# Patient Record
Sex: MALE | Race: WHITE | NOT HISPANIC OR LATINO | Employment: PART TIME | URBAN - METROPOLITAN AREA
[De-identification: names, ages, dates, MRNs, and addresses within clinical notes are randomized per-mention and may not be internally consistent; named-entity substitution may affect disease eponyms.]

---

## 2023-06-03 LAB
ALANINE AMINOTRANSFERASE (SGPT) (U/L) IN SER/PLAS: 29 U/L (ref 10–52)
ALBUMIN (G/DL) IN SER/PLAS: 4.6 G/DL (ref 3.4–5)
ALKALINE PHOSPHATASE (U/L) IN SER/PLAS: 73 U/L (ref 33–120)
ANION GAP IN SER/PLAS: 15 MMOL/L (ref 10–20)
ASPARTATE AMINOTRANSFERASE (SGOT) (U/L) IN SER/PLAS: 27 U/L (ref 9–39)
BILIRUBIN TOTAL (MG/DL) IN SER/PLAS: 1.1 MG/DL (ref 0–1.2)
CALCIUM (MG/DL) IN SER/PLAS: 10.2 MG/DL (ref 8.6–10.6)
CARBON DIOXIDE, TOTAL (MMOL/L) IN SER/PLAS: 26 MMOL/L (ref 21–32)
CHLORIDE (MMOL/L) IN SER/PLAS: 104 MMOL/L (ref 98–107)
CHOLESTEROL (MG/DL) IN SER/PLAS: 181 MG/DL (ref 0–199)
CHOLESTEROL IN HDL (MG/DL) IN SER/PLAS: 28.1 MG/DL
CHOLESTEROL/HDL RATIO: 6.4
CREATININE (MG/DL) IN SER/PLAS: 1.05 MG/DL (ref 0.5–1.3)
ERYTHROCYTE DISTRIBUTION WIDTH (RATIO) BY AUTOMATED COUNT: 13.3 % (ref 11.5–14.5)
ERYTHROCYTE MEAN CORPUSCULAR HEMOGLOBIN CONCENTRATION (G/DL) BY AUTOMATED: 32.4 G/DL (ref 32–36)
ERYTHROCYTE MEAN CORPUSCULAR VOLUME (FL) BY AUTOMATED COUNT: 88 FL (ref 80–100)
ERYTHROCYTES (10*6/UL) IN BLOOD BY AUTOMATED COUNT: 5.55 X10E12/L (ref 4.5–5.9)
GFR MALE: >90 ML/MIN/1.73M2
GLUCOSE (MG/DL) IN SER/PLAS: 87 MG/DL (ref 74–99)
HEMATOCRIT (%) IN BLOOD BY AUTOMATED COUNT: 49.1 % (ref 41–52)
HEMOGLOBIN (G/DL) IN BLOOD: 15.9 G/DL (ref 13.5–17.5)
LDL: 97 MG/DL (ref 0–99)
LEUKOCYTES (10*3/UL) IN BLOOD BY AUTOMATED COUNT: 11 X10E9/L (ref 4.4–11.3)
NON HDL CHOLESTEROL: 153 MG/DL
NRBC (PER 100 WBCS) BY AUTOMATED COUNT: 0 /100 WBC (ref 0–0)
PLATELETS (10*3/UL) IN BLOOD AUTOMATED COUNT: 255 X10E9/L (ref 150–450)
POTASSIUM (MMOL/L) IN SER/PLAS: 4.2 MMOL/L (ref 3.5–5.3)
PROTEIN TOTAL: 7.2 G/DL (ref 6.4–8.2)
SODIUM (MMOL/L) IN SER/PLAS: 141 MMOL/L (ref 136–145)
TESTOSTERONE (NG/DL) IN SER/PLAS: 171 NG/DL (ref 240–1000)
TRIGLYCERIDE (MG/DL) IN SER/PLAS: 279 MG/DL (ref 0–149)
UREA NITROGEN (MG/DL) IN SER/PLAS: 11 MG/DL (ref 6–23)
VLDL: 56 MG/DL (ref 0–40)

## 2023-08-30 PROBLEM — E78.5 HYPERLIPIDEMIA: Status: ACTIVE | Noted: 2023-08-30

## 2023-08-30 PROBLEM — Q98.4: Status: ACTIVE | Noted: 2023-08-30

## 2023-08-30 PROBLEM — E29.1 HYPOGONADISM IN MALE: Status: ACTIVE | Noted: 2023-08-30

## 2023-08-30 PROBLEM — L65.9 ALOPECIA OF SCALP: Status: ACTIVE | Noted: 2023-08-30

## 2023-08-30 PROBLEM — J45.909 ASTHMA (HHS-HCC): Status: ACTIVE | Noted: 2023-08-30

## 2023-08-30 PROBLEM — R55 SYNCOPE AND COLLAPSE: Status: ACTIVE | Noted: 2023-08-30

## 2023-08-30 RX ORDER — TESTOSTERONE 4 MG/D
PATCH TRANSDERMAL EVERY 24 HOURS
COMMUNITY
Start: 2015-10-14 | End: 2024-02-12 | Stop reason: WASHOUT

## 2023-08-30 RX ORDER — CLINDAMYCIN HYDROCHLORIDE 150 MG/1
CAPSULE ORAL
COMMUNITY
End: 2024-04-18 | Stop reason: WASHOUT

## 2023-08-30 RX ORDER — TESTOSTERONE 2 MG/D
PATCH TRANSDERMAL
COMMUNITY
End: 2024-02-12 | Stop reason: WASHOUT

## 2023-10-07 ENCOUNTER — LAB (OUTPATIENT)
Dept: LAB | Facility: LAB | Age: 28
End: 2023-10-07
Payer: COMMERCIAL

## 2023-10-07 DIAGNOSIS — E29.1 TESTICULAR HYPOFUNCTION: Primary | ICD-10-CM

## 2023-10-07 LAB
BASOPHILS # BLD AUTO: 0.17 X10*3/UL (ref 0–0.1)
BASOPHILS NFR BLD AUTO: 1.4 %
EOSINOPHIL # BLD AUTO: 0.83 X10*3/UL (ref 0–0.7)
EOSINOPHIL NFR BLD AUTO: 6.8 %
ERYTHROCYTE [DISTWIDTH] IN BLOOD BY AUTOMATED COUNT: 13.2 % (ref 11.5–14.5)
HCT VFR BLD AUTO: 50.1 % (ref 41–52)
HGB BLD-MCNC: 16.2 G/DL (ref 13.5–17.5)
IMM GRANULOCYTES # BLD AUTO: 0.1 X10*3/UL (ref 0–0.7)
IMM GRANULOCYTES NFR BLD AUTO: 0.8 % (ref 0–0.9)
LYMPHOCYTES # BLD AUTO: 4.77 X10*3/UL (ref 1.2–4.8)
LYMPHOCYTES NFR BLD AUTO: 39 %
MCH RBC QN AUTO: 28.8 PG (ref 26–34)
MCHC RBC AUTO-ENTMCNC: 32.3 G/DL (ref 32–36)
MCV RBC AUTO: 89 FL (ref 80–100)
MONOCYTES # BLD AUTO: 0.89 X10*3/UL (ref 0.1–1)
MONOCYTES NFR BLD AUTO: 7.3 %
NEUTROPHILS # BLD AUTO: 5.47 X10*3/UL (ref 1.2–7.7)
NEUTROPHILS NFR BLD AUTO: 44.7 %
NRBC BLD-RTO: 0 /100 WBCS (ref 0–0)
PLATELET # BLD AUTO: 260 X10*3/UL (ref 150–450)
PMV BLD AUTO: 10 FL (ref 7.5–11.5)
RBC # BLD AUTO: 5.62 X10*6/UL (ref 4.5–5.9)
TESTOST SERPL-MCNC: 257 NG/DL (ref 240–1000)
WBC # BLD AUTO: 12.2 X10*3/UL (ref 4.4–11.3)

## 2023-10-07 PROCEDURE — 84403 ASSAY OF TOTAL TESTOSTERONE: CPT

## 2023-10-07 PROCEDURE — 36415 COLL VENOUS BLD VENIPUNCTURE: CPT

## 2023-10-07 PROCEDURE — 85025 COMPLETE CBC W/AUTO DIFF WBC: CPT

## 2023-10-09 ENCOUNTER — APPOINTMENT (OUTPATIENT)
Dept: RADIOLOGY | Facility: HOSPITAL | Age: 28
End: 2023-10-09
Payer: COMMERCIAL

## 2023-10-09 ENCOUNTER — APPOINTMENT (OUTPATIENT)
Dept: ENDOCRINOLOGY | Facility: CLINIC | Age: 28
End: 2023-10-09
Payer: COMMERCIAL

## 2023-10-09 ENCOUNTER — HOSPITAL ENCOUNTER (EMERGENCY)
Facility: HOSPITAL | Age: 28
Discharge: HOME | End: 2023-10-10
Attending: STUDENT IN AN ORGANIZED HEALTH CARE EDUCATION/TRAINING PROGRAM
Payer: COMMERCIAL

## 2023-10-09 DIAGNOSIS — R10.9 ABDOMINAL PAIN, UNSPECIFIED ABDOMINAL LOCATION: ICD-10-CM

## 2023-10-09 DIAGNOSIS — R55 SYNCOPE, UNSPECIFIED SYNCOPE TYPE: ICD-10-CM

## 2023-10-09 DIAGNOSIS — J18.9 PNEUMONIA OF BOTH LUNGS DUE TO INFECTIOUS ORGANISM, UNSPECIFIED PART OF LUNG: Primary | ICD-10-CM

## 2023-10-09 LAB
ANION GAP SERPL CALC-SCNC: 12 MMOL/L
BASOPHILS # BLD AUTO: 0.11 X10*3/UL (ref 0–0.1)
BASOPHILS NFR BLD AUTO: 0.6 %
BUN SERPL-MCNC: 12 MG/DL (ref 8–25)
CALCIUM SERPL-MCNC: 10 MG/DL (ref 8.5–10.4)
CHLORIDE SERPL-SCNC: 99 MMOL/L (ref 97–107)
CO2 SERPL-SCNC: 24 MMOL/L (ref 24–31)
CREAT SERPL-MCNC: 1.1 MG/DL (ref 0.4–1.6)
EOSINOPHIL # BLD AUTO: 0.05 X10*3/UL (ref 0–0.7)
EOSINOPHIL NFR BLD AUTO: 0.3 %
ERYTHROCYTE [DISTWIDTH] IN BLOOD BY AUTOMATED COUNT: 13.9 % (ref 11.5–14.5)
GFR SERPL CREATININE-BSD FRML MDRD: >90 ML/MIN/1.73M*2
GLUCOSE SERPL-MCNC: 113 MG/DL (ref 65–99)
HCT VFR BLD AUTO: 47.4 % (ref 41–52)
HGB BLD-MCNC: 16.1 G/DL (ref 13.5–17.5)
IMM GRANULOCYTES # BLD AUTO: 0.08 X10*3/UL (ref 0–0.7)
IMM GRANULOCYTES NFR BLD AUTO: 0.4 % (ref 0–0.9)
LYMPHOCYTES # BLD AUTO: 1.57 X10*3/UL (ref 1.2–4.8)
LYMPHOCYTES NFR BLD AUTO: 8.6 %
MCH RBC QN AUTO: 29.2 PG (ref 26–34)
MCHC RBC AUTO-ENTMCNC: 34 G/DL (ref 32–36)
MCV RBC AUTO: 86 FL (ref 80–100)
MONOCYTES # BLD AUTO: 1.41 X10*3/UL (ref 0.1–1)
MONOCYTES NFR BLD AUTO: 7.7 %
NEUTROPHILS # BLD AUTO: 15.06 X10*3/UL (ref 1.2–7.7)
NEUTROPHILS NFR BLD AUTO: 82.4 %
NRBC BLD-RTO: 0 /100 WBCS (ref 0–0)
PLATELET # BLD AUTO: 231 X10*3/UL (ref 150–450)
PMV BLD AUTO: 9.3 FL (ref 7.5–11.5)
POTASSIUM SERPL-SCNC: 4.1 MMOL/L (ref 3.4–5.1)
RBC # BLD AUTO: 5.51 X10*6/UL (ref 4.5–5.9)
SODIUM SERPL-SCNC: 135 MMOL/L (ref 133–145)
TROPONIN T SERPL-MCNC: <6 NG/L
TROPONIN T SERPL-MCNC: <6 NG/L
WBC # BLD AUTO: 18.3 X10*3/UL (ref 4.4–11.3)

## 2023-10-09 PROCEDURE — 96365 THER/PROPH/DIAG IV INF INIT: CPT | Mod: XU

## 2023-10-09 PROCEDURE — 2550000001 HC RX 255 CONTRASTS: Performed by: STUDENT IN AN ORGANIZED HEALTH CARE EDUCATION/TRAINING PROGRAM

## 2023-10-09 PROCEDURE — 99285 EMERGENCY DEPT VISIT HI MDM: CPT

## 2023-10-09 PROCEDURE — 74177 CT ABD & PELVIS W/CONTRAST: CPT

## 2023-10-09 PROCEDURE — 84484 ASSAY OF TROPONIN QUANT: CPT | Performed by: STUDENT IN AN ORGANIZED HEALTH CARE EDUCATION/TRAINING PROGRAM

## 2023-10-09 PROCEDURE — 36415 COLL VENOUS BLD VENIPUNCTURE: CPT | Performed by: STUDENT IN AN ORGANIZED HEALTH CARE EDUCATION/TRAINING PROGRAM

## 2023-10-09 PROCEDURE — 96368 THER/DIAG CONCURRENT INF: CPT

## 2023-10-09 PROCEDURE — 85025 COMPLETE CBC W/AUTO DIFF WBC: CPT | Performed by: STUDENT IN AN ORGANIZED HEALTH CARE EDUCATION/TRAINING PROGRAM

## 2023-10-09 PROCEDURE — 96361 HYDRATE IV INFUSION ADD-ON: CPT

## 2023-10-09 PROCEDURE — 71046 X-RAY EXAM CHEST 2 VIEWS: CPT | Mod: FY

## 2023-10-09 PROCEDURE — 99283 EMERGENCY DEPT VISIT LOW MDM: CPT

## 2023-10-09 PROCEDURE — 82435 ASSAY OF BLOOD CHLORIDE: CPT | Performed by: STUDENT IN AN ORGANIZED HEALTH CARE EDUCATION/TRAINING PROGRAM

## 2023-10-09 PROCEDURE — 84484 ASSAY OF TROPONIN QUANT: CPT | Mod: 91 | Performed by: STUDENT IN AN ORGANIZED HEALTH CARE EDUCATION/TRAINING PROGRAM

## 2023-10-09 PROCEDURE — 2500000004 HC RX 250 GENERAL PHARMACY W/ HCPCS (ALT 636 FOR OP/ED): Performed by: STUDENT IN AN ORGANIZED HEALTH CARE EDUCATION/TRAINING PROGRAM

## 2023-10-09 RX ORDER — AZITHROMYCIN 100 MG/5ML
100 POWDER, FOR SUSPENSION ORAL DAILY
Qty: 20 ML | Refills: 0 | Status: SHIPPED | OUTPATIENT
Start: 2023-10-09 | End: 2023-10-13

## 2023-10-09 RX ORDER — AMOXICILLIN AND CLAVULANATE POTASSIUM 400; 57 MG/5ML; MG/5ML
875 POWDER, FOR SUSPENSION ORAL 2 TIMES DAILY
Qty: 218 ML | Refills: 0 | Status: SHIPPED | OUTPATIENT
Start: 2023-10-09 | End: 2023-10-19

## 2023-10-09 RX ORDER — AMOXICILLIN AND CLAVULANATE POTASSIUM 875; 125 MG/1; MG/1
1 TABLET, FILM COATED ORAL ONCE
Status: DISCONTINUED | OUTPATIENT
Start: 2023-10-09 | End: 2023-10-09

## 2023-10-09 RX ORDER — AZITHROMYCIN 500 MG/1
500 TABLET, FILM COATED ORAL ONCE
Status: DISCONTINUED | OUTPATIENT
Start: 2023-10-09 | End: 2023-10-09

## 2023-10-09 RX ORDER — ONDANSETRON HYDROCHLORIDE 4 MG/5ML
4 SOLUTION ORAL 2 TIMES DAILY PRN
Qty: 50 ML | Refills: 0 | Status: SHIPPED | OUTPATIENT
Start: 2023-10-09 | End: 2024-04-15 | Stop reason: WASHOUT

## 2023-10-09 RX ORDER — CEFTRIAXONE 1 G/50ML
1 INJECTION, SOLUTION INTRAVENOUS ONCE
Status: COMPLETED | OUTPATIENT
Start: 2023-10-09 | End: 2023-10-09

## 2023-10-09 RX ADMIN — SODIUM CHLORIDE 1000 ML: 900 INJECTION, SOLUTION INTRAVENOUS at 19:50

## 2023-10-09 RX ADMIN — SODIUM CHLORIDE 1000 ML: 900 INJECTION, SOLUTION INTRAVENOUS at 22:13

## 2023-10-09 RX ADMIN — IOHEXOL 75 ML: 350 INJECTION, SOLUTION INTRAVENOUS at 21:15

## 2023-10-09 RX ADMIN — CEFTRIAXONE SODIUM 1 G: 1 INJECTION, SOLUTION INTRAVENOUS at 21:26

## 2023-10-09 RX ADMIN — AZITHROMYCIN 500 MG: 500 INJECTION, POWDER, LYOPHILIZED, FOR SOLUTION INTRAVENOUS at 21:20

## 2023-10-09 ASSESSMENT — PAIN SCALES - GENERAL: PAINLEVEL_OUTOF10: 7

## 2023-10-09 ASSESSMENT — PAIN - FUNCTIONAL ASSESSMENT: PAIN_FUNCTIONAL_ASSESSMENT: 0-10

## 2023-10-09 ASSESSMENT — PAIN DESCRIPTION - LOCATION: LOCATION: ABDOMEN

## 2023-10-09 ASSESSMENT — COLUMBIA-SUICIDE SEVERITY RATING SCALE - C-SSRS
1. IN THE PAST MONTH, HAVE YOU WISHED YOU WERE DEAD OR WISHED YOU COULD GO TO SLEEP AND NOT WAKE UP?: NO
6. HAVE YOU EVER DONE ANYTHING, STARTED TO DO ANYTHING, OR PREPARED TO DO ANYTHING TO END YOUR LIFE?: NO
2. HAVE YOU ACTUALLY HAD ANY THOUGHTS OF KILLING YOURSELF?: NO

## 2023-10-09 NOTE — ED PROVIDER NOTES
"HPI   Chief Complaint   Patient presents with    Syncope     Ems states \"We were called across the street for the patient passing out in the lobby at his endo appt. He was conscious when we got there and appropriately acting himself, he is developmentally delayed. Per his mother he tends to be a little dramatic with stuff, other than that patients vitals are all stable.\" Pt states \"my belly hurts all over\" pt denies CP/SOB/N/V/fever/chills       Patient presents to the emergency department after a syncopal episode.  He was with his mother at a doctor's appointment when he stated that he did not feel right his eyes rolled back and he passed out.  He has had a previous episode several years ago while at a softball game in the bathroom in which she also passed out.  He states that his abdomen has been intermittently hurting over the last several days and his mother states that he has not been eating or drinking as much as usual.  He has not been vomiting however.                          No data recorded                Patient History   Past Medical History:   Diagnosis Date    Allergy status to unspecified drugs, medicaments and biological substances 10/05/2015    History of seasonal allergies    Allergy to other foods 10/05/2015    History of food allergy     History reviewed. No pertinent surgical history.  Family History   Problem Relation Name Age of Onset    Arthritis Mother      Hypertension Mother      No Known Problems Father      Colon cancer Maternal Grandmother      Arthritis Maternal Grandmother      Hypothyroidism Maternal Grandmother      Hyperlipidemia Maternal Grandfather      Colon cancer Paternal Grandfather      Breast cancer Other aunt      Social History     Tobacco Use    Smoking status: Never    Smokeless tobacco: Never   Substance Use Topics    Alcohol use: Never    Drug use: Never       Physical Exam   ED Triage Vitals [10/09/23 1720]   Temp Heart Rate Resp BP   36.6 °C (97.9 °F) 107 20 123/72 "      SpO2 Temp Source Heart Rate Source Patient Position   92 % Oral Monitor Sitting      BP Location FiO2 (%)     Left arm --       Physical Exam  HENT:      Head: Normocephalic.   Eyes:      General: No scleral icterus.  Cardiovascular:      Rate and Rhythm: Tachycardia present.   Pulmonary:      Effort: Pulmonary effort is normal.   Abdominal:      Comments: Soft, nontender to palpation   Skin:     General: Skin is warm.   Neurological:      Mental Status: He is alert.      Comments: Patient awake, answers questions appropriately, follows commands.  Moves all extremities normally.       EKG interpretation by me: Sinus tachycardia, rate 105.  Normal axis.  No ST or T wave abnormalities.  Normal QTc and QRS durations.    ED Course & MDM   Diagnoses as of 10/09/23 2300   Pneumonia of both lungs due to infectious organism, unspecified part of lung   Abdominal pain, unspecified abdominal location   Syncope, unspecified syncope type       Medical Decision Making  Laboratory studies significant for leukocytosis.  Chest x-ray reveals pneumonia, as does CT abdomen.  Patient given first dose of IV antibiotics.  Following IV fluids, heart rate normalized.  While patient does meet SIRS criteria with initially elevated heart rate and elevated white blood cell count, patient is otherwise well-appearing.  My suspicion for sepsis is thus lower.  Patient and mother were given option for trial of outpatient therapy with oral antibiotics and they prefer trial of outpatient therapy.  Strict return precautions given for any worsening of symptoms, inability to tolerate antibiotics or oral intake and they agree to return.  Patient advised to follow-up with primary care physician.  My suspicion for cardiac arrhythmia as etiology of syncope is very low.  Parts of this chart were completed with dictation software, please excuse any errors in transcription.        Procedure  Procedures     Kerwin Chicas MD  10/09/23 8104

## 2023-10-10 ENCOUNTER — HOSPITAL ENCOUNTER (OUTPATIENT)
Dept: CARDIOLOGY | Facility: HOSPITAL | Age: 28
Discharge: HOME | End: 2023-10-10
Payer: COMMERCIAL

## 2023-10-10 VITALS
RESPIRATION RATE: 20 BRPM | TEMPERATURE: 98.4 F | DIASTOLIC BLOOD PRESSURE: 70 MMHG | HEIGHT: 71 IN | BODY MASS INDEX: 34.6 KG/M2 | SYSTOLIC BLOOD PRESSURE: 120 MMHG | WEIGHT: 247.14 LBS | HEART RATE: 99 BPM | OXYGEN SATURATION: 93 %

## 2023-10-10 LAB
ATRIAL RATE: 105 BPM
P AXIS: 23 DEGREES
P OFFSET: 202 MS
P ONSET: 154 MS
PR INTERVAL: 138 MS
Q ONSET: 223 MS
QRS COUNT: 17 BEATS
QRS DURATION: 94 MS
QT INTERVAL: 322 MS
QTC CALCULATION(BAZETT): 425 MS
QTC FREDERICIA: 388 MS
R AXIS: 3 DEGREES
T AXIS: 6 DEGREES
T OFFSET: 384 MS
VENTRICULAR RATE: 105 BPM

## 2023-10-10 PROCEDURE — 93005 ELECTROCARDIOGRAM TRACING: CPT

## 2023-10-10 NOTE — DISCHARGE INSTRUCTIONS
Your x-ray and CAT scan revealed pneumonia.  You have been given a prescription for antibiotics for pneumonia as well as a prescription for nausea medication to use as needed.  You should try to drink extra fluids over the coming days.  You may use Tylenol as needed for discomfort or fever.  Follow-up with your primary care physician.  Return to the emergency department with any worsening symptoms or inability to tolerate your antibiotics.    It is important to remember that your care does not end here and you must continue to monitor your condition closely. Please return to the emergency department for any worsening or concerning signs or symptoms as directed by our conversations and the discharge instructions. If you do not have a doctor please contact the referral number on your discharge instructions. Please contact any physician specialists provided in your discharge notes as it is very important to follow up with them regarding your condition. If you are unable to reach the physicians provided, please come back to the Emergency Department at any time.    Return to emergency room without delay for ANY new or worsening pains or for any other symptoms or concerns.  Return with worsening pains, nausea, vomiting, trouble breathing, palpitations, shortness of breath, inability to pass stool or urine, loss of control of stool or urine, any numbness or tingling (that is not normal for you), uncontrolled fevers, the passing of blood or other material in stool or urine, rashes, pains or for any other symptoms or concerns you may have.  You are always welcome to return to the ER at any time for any reason or for any other concerns you may have.

## 2023-10-10 NOTE — ED NOTES
Patient states that he is here after passing out at his PCP's office today. Patient states that he has had abdominal pain, cough and chills since Saturday. Patient states that his cough has been productive. Patient denies fever, N/V/D.      Susie Varma RN  10/09/23 2011

## 2023-10-11 ENCOUNTER — TELEPHONE (OUTPATIENT)
Dept: PRIMARY CARE | Facility: CLINIC | Age: 28
End: 2023-10-11
Payer: COMMERCIAL

## 2023-10-11 DIAGNOSIS — J18.9 PNEUMONIA DUE TO INFECTIOUS ORGANISM, UNSPECIFIED LATERALITY, UNSPECIFIED PART OF LUNG: ICD-10-CM

## 2023-10-11 RX ORDER — BENZONATATE 200 MG/1
200 CAPSULE ORAL 3 TIMES DAILY PRN
Qty: 30 CAPSULE | Refills: 0 | Status: SHIPPED | OUTPATIENT
Start: 2023-10-11 | End: 2023-10-21

## 2023-10-11 NOTE — TELEPHONE ENCOUNTER
PT was at [facility] 10/9  dx: pneumonia  - f/u 10/17 w/ NP     Pt was on IV antibiotics @ ER, on oral antibiotics now. Coughs nonstop. Requests med for cough     [facility].

## 2023-10-17 ENCOUNTER — APPOINTMENT (OUTPATIENT)
Dept: PRIMARY CARE | Facility: CLINIC | Age: 28
End: 2023-10-17
Payer: COMMERCIAL

## 2023-10-24 ENCOUNTER — TELEPHONE (OUTPATIENT)
Dept: PRIMARY CARE | Facility: CLINIC | Age: 28
End: 2023-10-24
Payer: COMMERCIAL

## 2023-10-24 DIAGNOSIS — J45.20 MILD INTERMITTENT ASTHMA, UNSPECIFIED WHETHER COMPLICATED (HHS-HCC): Primary | ICD-10-CM

## 2023-10-24 RX ORDER — ALBUTEROL SULFATE 90 UG/1
2 AEROSOL, METERED RESPIRATORY (INHALATION) EVERY 4 HOURS PRN
Qty: 8.5 G | Refills: 3 | Status: SHIPPED | OUTPATIENT
Start: 2023-10-24 | End: 2024-10-23

## 2023-10-24 NOTE — TELEPHONE ENCOUNTER
I tried to do a refill request for patient's albuterol and have you send it to [facility] [address] [facility].  It would not let me do the refill process.

## 2023-11-17 ENCOUNTER — ANCILLARY PROCEDURE (OUTPATIENT)
Dept: RADIOLOGY | Facility: CLINIC | Age: 28
End: 2023-11-17
Payer: COMMERCIAL

## 2023-11-17 DIAGNOSIS — J18.9 PNEUMONIA DUE TO INFECTIOUS ORGANISM, UNSPECIFIED LATERALITY, UNSPECIFIED PART OF LUNG: ICD-10-CM

## 2023-11-17 PROCEDURE — 71046 X-RAY EXAM CHEST 2 VIEWS: CPT | Mod: FY

## 2023-11-20 DIAGNOSIS — E29.1 HYPOGONADISM IN MALE: Primary | ICD-10-CM

## 2023-11-20 DIAGNOSIS — E29.1 HYPOGONADISM IN MALE: ICD-10-CM

## 2023-11-24 ENCOUNTER — HOSPITAL ENCOUNTER (EMERGENCY)
Facility: HOSPITAL | Age: 28
Discharge: HOME | End: 2023-11-24
Attending: STUDENT IN AN ORGANIZED HEALTH CARE EDUCATION/TRAINING PROGRAM
Payer: COMMERCIAL

## 2023-11-24 ENCOUNTER — APPOINTMENT (OUTPATIENT)
Dept: RADIOLOGY | Facility: HOSPITAL | Age: 28
End: 2023-11-24
Payer: COMMERCIAL

## 2023-11-24 VITALS
DIASTOLIC BLOOD PRESSURE: 70 MMHG | BODY MASS INDEX: 34.36 KG/M2 | HEART RATE: 81 BPM | SYSTOLIC BLOOD PRESSURE: 138 MMHG | RESPIRATION RATE: 18 BRPM | TEMPERATURE: 98.1 F | OXYGEN SATURATION: 95 % | WEIGHT: 240 LBS | HEIGHT: 70 IN

## 2023-11-24 DIAGNOSIS — R10.84 GENERALIZED ABDOMINAL PAIN: Primary | ICD-10-CM

## 2023-11-24 LAB
ALBUMIN SERPL-MCNC: 4.8 G/DL (ref 3.5–5)
ALP BLD-CCNC: 86 U/L (ref 35–125)
ALT SERPL-CCNC: 30 U/L (ref 5–40)
AMORPH CRY #/AREA UR COMP ASSIST: NORMAL /HPF
ANION GAP SERPL CALC-SCNC: 13 MMOL/L
APPEARANCE UR: ABNORMAL
AST SERPL-CCNC: 27 U/L (ref 5–40)
BASOPHILS # BLD AUTO: 0.11 X10*3/UL (ref 0–0.1)
BASOPHILS NFR BLD AUTO: 1.3 %
BILIRUB SERPL-MCNC: 0.6 MG/DL (ref 0.1–1.2)
BILIRUB UR STRIP.AUTO-MCNC: NEGATIVE MG/DL
BUN SERPL-MCNC: 11 MG/DL (ref 8–25)
CALCIUM SERPL-MCNC: 9.8 MG/DL (ref 8.5–10.4)
CHLORIDE SERPL-SCNC: 104 MMOL/L (ref 97–107)
CO2 SERPL-SCNC: 22 MMOL/L (ref 24–31)
COLOR UR: ABNORMAL
CREAT SERPL-MCNC: 0.9 MG/DL (ref 0.4–1.6)
EOSINOPHIL # BLD AUTO: 0.63 X10*3/UL (ref 0–0.7)
EOSINOPHIL NFR BLD AUTO: 7.6 %
ERYTHROCYTE [DISTWIDTH] IN BLOOD BY AUTOMATED COUNT: 13.3 % (ref 11.5–14.5)
GFR SERPL CREATININE-BSD FRML MDRD: >90 ML/MIN/1.73M*2
GLUCOSE SERPL-MCNC: 107 MG/DL (ref 65–99)
GLUCOSE UR STRIP.AUTO-MCNC: NORMAL MG/DL
HCT VFR BLD AUTO: 46.8 % (ref 41–52)
HGB BLD-MCNC: 16 G/DL (ref 13.5–17.5)
IMM GRANULOCYTES # BLD AUTO: 0.02 X10*3/UL (ref 0–0.7)
IMM GRANULOCYTES NFR BLD AUTO: 0.2 % (ref 0–0.9)
KETONES UR STRIP.AUTO-MCNC: NEGATIVE MG/DL
LEUKOCYTE ESTERASE UR QL STRIP.AUTO: NEGATIVE
LIPASE SERPL-CCNC: 45 U/L (ref 16–63)
LYMPHOCYTES # BLD AUTO: 2.96 X10*3/UL (ref 1.2–4.8)
LYMPHOCYTES NFR BLD AUTO: 35.7 %
MCH RBC QN AUTO: 28.9 PG (ref 26–34)
MCHC RBC AUTO-ENTMCNC: 34.2 G/DL (ref 32–36)
MCV RBC AUTO: 85 FL (ref 80–100)
MONOCYTES # BLD AUTO: 0.85 X10*3/UL (ref 0.1–1)
MONOCYTES NFR BLD AUTO: 10.3 %
MUCOUS THREADS #/AREA URNS AUTO: NORMAL /LPF
NEUTROPHILS # BLD AUTO: 3.71 X10*3/UL (ref 1.2–7.7)
NEUTROPHILS NFR BLD AUTO: 44.9 %
NITRITE UR QL STRIP.AUTO: NEGATIVE
NRBC BLD-RTO: 0 /100 WBCS (ref 0–0)
PH UR STRIP.AUTO: 5.5 [PH]
PLATELET # BLD AUTO: 235 X10*3/UL (ref 150–450)
POTASSIUM SERPL-SCNC: 3.9 MMOL/L (ref 3.4–5.1)
PROT SERPL-MCNC: 8 G/DL (ref 5.9–7.9)
PROT UR STRIP.AUTO-MCNC: ABNORMAL MG/DL
RBC # BLD AUTO: 5.53 X10*6/UL (ref 4.5–5.9)
RBC # UR STRIP.AUTO: NEGATIVE /UL
RBC #/AREA URNS AUTO: NORMAL /HPF
SODIUM SERPL-SCNC: 139 MMOL/L (ref 133–145)
SP GR UR STRIP.AUTO: 1.03
UROBILINOGEN UR STRIP.AUTO-MCNC: ABNORMAL MG/DL
WBC # BLD AUTO: 8.3 X10*3/UL (ref 4.4–11.3)
WBC #/AREA URNS AUTO: NORMAL /HPF

## 2023-11-24 PROCEDURE — 2500000004 HC RX 250 GENERAL PHARMACY W/ HCPCS (ALT 636 FOR OP/ED): Performed by: PHYSICIAN ASSISTANT

## 2023-11-24 PROCEDURE — 80053 COMPREHEN METABOLIC PANEL: CPT | Performed by: PHYSICIAN ASSISTANT

## 2023-11-24 PROCEDURE — 84075 ASSAY ALKALINE PHOSPHATASE: CPT | Performed by: PHYSICIAN ASSISTANT

## 2023-11-24 PROCEDURE — 85025 COMPLETE CBC W/AUTO DIFF WBC: CPT | Performed by: PHYSICIAN ASSISTANT

## 2023-11-24 PROCEDURE — 74177 CT ABD & PELVIS W/CONTRAST: CPT

## 2023-11-24 PROCEDURE — 99285 EMERGENCY DEPT VISIT HI MDM: CPT | Mod: 25 | Performed by: STUDENT IN AN ORGANIZED HEALTH CARE EDUCATION/TRAINING PROGRAM

## 2023-11-24 PROCEDURE — 83690 ASSAY OF LIPASE: CPT | Performed by: PHYSICIAN ASSISTANT

## 2023-11-24 PROCEDURE — 81001 URINALYSIS AUTO W/SCOPE: CPT | Performed by: PHYSICIAN ASSISTANT

## 2023-11-24 PROCEDURE — 2550000001 HC RX 255 CONTRASTS: Performed by: STUDENT IN AN ORGANIZED HEALTH CARE EDUCATION/TRAINING PROGRAM

## 2023-11-24 PROCEDURE — 96374 THER/PROPH/DIAG INJ IV PUSH: CPT | Mod: 59

## 2023-11-24 PROCEDURE — 36415 COLL VENOUS BLD VENIPUNCTURE: CPT | Performed by: PHYSICIAN ASSISTANT

## 2023-11-24 PROCEDURE — 99284 EMERGENCY DEPT VISIT MOD MDM: CPT | Mod: 25

## 2023-11-24 PROCEDURE — 94760 N-INVAS EAR/PLS OXIMETRY 1: CPT

## 2023-11-24 PROCEDURE — 96375 TX/PRO/DX INJ NEW DRUG ADDON: CPT

## 2023-11-24 RX ORDER — KETOROLAC TROMETHAMINE 30 MG/ML
15 INJECTION, SOLUTION INTRAMUSCULAR; INTRAVENOUS ONCE
Status: COMPLETED | OUTPATIENT
Start: 2023-11-24 | End: 2023-11-24

## 2023-11-24 RX ORDER — ONDANSETRON HYDROCHLORIDE 2 MG/ML
4 INJECTION, SOLUTION INTRAVENOUS ONCE
Status: COMPLETED | OUTPATIENT
Start: 2023-11-24 | End: 2023-11-24

## 2023-11-24 RX ORDER — DICYCLOMINE HYDROCHLORIDE 20 MG/1
20 TABLET ORAL 2 TIMES DAILY
Qty: 20 TABLET | Refills: 0 | Status: SHIPPED | OUTPATIENT
Start: 2023-11-24 | End: 2023-11-28 | Stop reason: SDUPTHER

## 2023-11-24 RX ORDER — OMEPRAZOLE 20 MG/1
20 CAPSULE, DELAYED RELEASE ORAL DAILY
Qty: 30 CAPSULE | Refills: 0 | Status: SHIPPED | OUTPATIENT
Start: 2023-11-24 | End: 2023-12-24

## 2023-11-24 RX ADMIN — SODIUM CHLORIDE 1000 ML: 900 INJECTION, SOLUTION INTRAVENOUS at 09:19

## 2023-11-24 RX ADMIN — IOHEXOL 75 ML: 350 INJECTION, SOLUTION INTRAVENOUS at 10:39

## 2023-11-24 RX ADMIN — KETOROLAC TROMETHAMINE 15 MG: 30 INJECTION INTRAMUSCULAR; INTRAVENOUS at 09:20

## 2023-11-24 RX ADMIN — ONDANSETRON 4 MG: 2 INJECTION INTRAMUSCULAR; INTRAVENOUS at 09:20

## 2023-11-24 ASSESSMENT — PAIN - FUNCTIONAL ASSESSMENT
PAIN_FUNCTIONAL_ASSESSMENT: 0-10

## 2023-11-24 ASSESSMENT — COLUMBIA-SUICIDE SEVERITY RATING SCALE - C-SSRS
2. HAVE YOU ACTUALLY HAD ANY THOUGHTS OF KILLING YOURSELF?: NO
1. IN THE PAST MONTH, HAVE YOU WISHED YOU WERE DEAD OR WISHED YOU COULD GO TO SLEEP AND NOT WAKE UP?: NO
6. HAVE YOU EVER DONE ANYTHING, STARTED TO DO ANYTHING, OR PREPARED TO DO ANYTHING TO END YOUR LIFE?: NO

## 2023-11-24 ASSESSMENT — PAIN SCALES - GENERAL
PAINLEVEL_OUTOF10: 4
PAINLEVEL_OUTOF10: 3
PAINLEVEL_OUTOF10: 6
PAINLEVEL_OUTOF10: 6

## 2023-11-24 ASSESSMENT — PAIN DESCRIPTION - LOCATION: LOCATION: ABDOMEN

## 2023-11-24 NOTE — ED PROVIDER NOTES
HPI   Chief Complaint   Patient presents with    Abdominal Pain       28-year-old male presented emergency department with a chief complaint of several weeks of lower abdominal pain.  He complains of decreased appetite.  He is having normal bowel movements.  Denies dysuria.  Denies prior abdominal surgery.  Developmentally delayed.  Tried some Pepcid with minimal relief.  Has upcoming appointment with his physician in several days for this issue.  Was treated for pneumonia 2 months ago.                          No data recorded                Patient History   Past Medical History:   Diagnosis Date    Allergy status to unspecified drugs, medicaments and biological substances 10/05/2015    History of seasonal allergies    Allergy to other foods 10/05/2015    History of food allergy     No past surgical history on file.  Family History   Problem Relation Name Age of Onset    Arthritis Mother      Hypertension Mother      No Known Problems Father      Colon cancer Maternal Grandmother      Arthritis Maternal Grandmother      Hypothyroidism Maternal Grandmother      Hyperlipidemia Maternal Grandfather      Colon cancer Paternal Grandfather      Breast cancer Other aunt      Social History     Tobacco Use    Smoking status: Never    Smokeless tobacco: Never   Substance Use Topics    Alcohol use: Never    Drug use: Never       Physical Exam   ED Triage Vitals [11/24/23 0833]   Temp Heart Rate Resp BP   36.8 °C (98.2 °F) 84 18 141/74      SpO2 Temp src Heart Rate Source Patient Position   95 % -- -- --      BP Location FiO2 (%)     -- --       Physical Exam  Vitals and nursing note reviewed.   Constitutional:       Appearance: He is well-developed.   HENT:      Head: Normocephalic and atraumatic.   Cardiovascular:      Rate and Rhythm: Normal rate and regular rhythm.   Pulmonary:      Effort: Pulmonary effort is normal.   Abdominal:      General: Abdomen is flat.      Comments: Left lower abdominal tenderness, soft, no  rebound or guarding   Skin:     General: Skin is warm.   Neurological:      General: No focal deficit present.      Mental Status: He is alert.   Psychiatric:         Mood and Affect: Mood normal.         ED Course & MDM   Diagnoses as of 11/24/23 1139   Generalized abdominal pain       Medical Decision Making  I have seen and evaluated this patient.  The attending physician has also seen and evaluated this patient.  Vital signs, laboratory testing and diagnostic images if applicable have been reviewed.  All laboratory and imaging is interpreted by myself unless otherwise stated.  Radiology studies are also formally interpreted by radiologist.    CBC without significant leukocytosis or anemia, metabolic panel without significant renal impairment or electrolyte abnormality.  CT scan acutely negative.  Patient will be discharged on acid reduction medicine with gastroenterology follow-up.  Released in good condition.      Labs Reviewed   CBC WITH AUTO DIFFERENTIAL - Abnormal       Result Value    WBC 8.3      nRBC 0.0      RBC 5.53      Hemoglobin 16.0      Hematocrit 46.8      MCV 85      MCH 28.9      MCHC 34.2      RDW 13.3      Platelets 235      Neutrophils % 44.9      Immature Granulocytes %, Automated 0.2      Lymphocytes % 35.7      Monocytes % 10.3      Eosinophils % 7.6      Basophils % 1.3      Neutrophils Absolute 3.71      Immature Granulocytes Absolute, Automated 0.02      Lymphocytes Absolute 2.96      Monocytes Absolute 0.85      Eosinophils Absolute 0.63      Basophils Absolute 0.11 (*)    COMPREHENSIVE METABOLIC PANEL - Abnormal    Glucose 107 (*)     Sodium 139      Potassium 3.9      Chloride 104      Bicarbonate 22 (*)     Urea Nitrogen 11      Creatinine 0.90      eGFR >90      Calcium 9.8      Albumin 4.8      Alkaline Phosphatase 86      Total Protein 8.0 (*)     AST 27      Bilirubin, Total 0.6      ALT 30      Anion Gap 13     URINALYSIS WITH REFLEX MICROSCOPIC AND CULTURE - Abnormal    Color,  Urine Orange (*)     Appearance, Urine Ex.Turbid (*)     Specific Gravity, Urine 1.035      pH, Urine 5.5      Protein, Urine 100 (2+) (*)     Glucose, Urine Normal      Blood, Urine NEGATIVE      Ketones, Urine NEGATIVE      Bilirubin, Urine NEGATIVE      Urobilinogen, Urine 2 (1+) (*)     Nitrite, Urine NEGATIVE      Leukocyte Esterase, Urine NEGATIVE     LIPASE - Normal    Lipase 45     URINALYSIS WITH REFLEX MICROSCOPIC AND CULTURE    Narrative:     The following orders were created for panel order Urinalysis with Reflex Microscopic and Culture.  Procedure                               Abnormality         Status                     ---------                               -----------         ------                     Urinalysis with Reflex M...[[id number]]  Abnormal            Final result               Extra Urine Gray Tube[[id number]]                                                         Please view results for these tests on the individual orders.   EXTRA URINE GRAY TUBE   URINALYSIS MICROSCOPIC WITH REFLEX CULTURE    WBC, Urine NONE      RBC, Urine NONE      Mucus, Urine 4+      Amorphous Crystals, Urine 1+       CT abdomen pelvis w IV contrast   Final Result   No acute intra-abdominal/pelvic findings.        MACRO:   None        Signed by: Jama Zarate 11/24/2023 11:23 AM   Dictation workstation:   [id number]        Medications   ketorolac (Toradol) injection 15 mg (15 mg intravenous Given 11/24/23 0920)   ondansetron (Zofran) injection 4 mg (4 mg intravenous Given 11/24/23 0920)   sodium chloride 0.9 % bolus 1,000 mL (0 mL intravenous Stopped 11/24/23 0949)   iohexol (OMNIPaque) 350 mg iodine/mL solution 75 mL (75 mL intravenous Given 11/24/23 1039)     New Prescriptions    DICYCLOMINE (BENTYL) 20 MG TABLET    Take 1 tablet (20 mg) by mouth 2 times a day for 10 days.    OMEPRAZOLE (PRILOSEC) 20 MG DR CAPSULE    Take 1 capsule (20 mg) by mouth once daily. Do not crush or chew.         Procedure  Procedures      Quincy Walton PA-C  11/24/23 1146

## 2023-11-24 NOTE — ED TRIAGE NOTES
Pt complains of abdominal pain that's been going on for a few weeks. Pt stsates pain has increased since 5 am. Pt denies any n/v/d.

## 2023-11-28 ENCOUNTER — OFFICE VISIT (OUTPATIENT)
Dept: PRIMARY CARE | Facility: CLINIC | Age: 28
End: 2023-11-28
Payer: COMMERCIAL

## 2023-11-28 VITALS
SYSTOLIC BLOOD PRESSURE: 120 MMHG | BODY MASS INDEX: 34.93 KG/M2 | HEART RATE: 75 BPM | WEIGHT: 244 LBS | DIASTOLIC BLOOD PRESSURE: 70 MMHG | HEIGHT: 70 IN | TEMPERATURE: 97.4 F | OXYGEN SATURATION: 97 %

## 2023-11-28 DIAGNOSIS — R10.30 LOWER ABDOMINAL PAIN: Primary | ICD-10-CM

## 2023-11-28 DIAGNOSIS — R10.84 GENERALIZED ABDOMINAL PAIN: ICD-10-CM

## 2023-11-28 DIAGNOSIS — J18.9 COMMUNITY ACQUIRED PNEUMONIA, UNSPECIFIED LATERALITY: ICD-10-CM

## 2023-11-28 PROCEDURE — 99214 OFFICE O/P EST MOD 30 MIN: CPT | Performed by: NURSE PRACTITIONER

## 2023-11-28 PROCEDURE — 1036F TOBACCO NON-USER: CPT | Performed by: NURSE PRACTITIONER

## 2023-11-28 RX ORDER — DICYCLOMINE HYDROCHLORIDE 20 MG/1
20 TABLET ORAL 2 TIMES DAILY
Qty: 60 TABLET | Refills: 1 | Status: SHIPPED | OUTPATIENT
Start: 2023-11-28 | End: 2024-01-27

## 2023-11-28 ASSESSMENT — ENCOUNTER SYMPTOMS
FEVER: 0
SHORTNESS OF BREATH: 0
VOMITING: 0
CONSTIPATION: 0
WHEEZING: 0
PALPITATIONS: 0
FREQUENCY: 0
ABDOMINAL PAIN: 1
DIARRHEA: 0
NAUSEA: 0
BLOOD IN STOOL: 0
DEPRESSION: 0
FATIGUE: 0
FLANK PAIN: 0
CHEST TIGHTNESS: 0
COUGH: 0
DIAPHORESIS: 0
OCCASIONAL FEELINGS OF UNSTEADINESS: 0
APPETITE CHANGE: 0
HEMATURIA: 0
DYSURIA: 0
LOSS OF SENSATION IN FEET: 0
CHILLS: 0

## 2023-11-28 ASSESSMENT — PATIENT HEALTH QUESTIONNAIRE - PHQ9
SUM OF ALL RESPONSES TO PHQ9 QUESTIONS 1 AND 2: 0
1. LITTLE INTEREST OR PLEASURE IN DOING THINGS: NOT AT ALL
2. FEELING DOWN, DEPRESSED OR HOPELESS: NOT AT ALL

## 2023-11-28 ASSESSMENT — PAIN SCALES - GENERAL: PAINLEVEL: 4

## 2023-11-28 NOTE — PROGRESS NOTES
[facility]: [facility]  PROGRESS NOTE      Boaz Ramirez is a 28 y.o. male that is presenting today for ED follow up. Patient was seen at [facility] 10/09/23 with c/o syncopal episode. VS and PE unremarkable. Labs significant for Leukocytosis, CXR reveals pneumonia as did CT Abdomen. He was given 1 dose IV abx. Given Rx for oral abx (Augmentin and Azithromycin) and instructed to follow up with PCP. Also provided with Rx for Zofran.    Patient returned to ED [facility] on 11/24/23 with c/o several week onset of lower abdominal pain. Pepcid with minimal relief. VS unremarkable. PE demonstrated LLQ abdominal tenderness, soft, no rebound or guarding. Labs unremarkable. CT acutely negative. He was discharged on Omeprazole and Bentyl with GI follow up.    Assessment/Plan   Diagnoses and all orders for this visit:  Lower abdominal pain  -     Referral to Gastroenterology; Future  Generalized abdominal pain  -     continue dicyclomine (Bentyl) 20 mg tablet; Take 1 tablet (20 mg) by mouth 2 times a day.  Subjective   Patient presents with Mother. Report he completed both abx as directed. 11/17/23 CXR negative.  He denies cough, SOB, wheezing, fever, chills, nausea or vomiting. He admits to using inhaler more at first but it does not appear he is using it now.     Patient reports he has been taking Bentyl and Omeprazole as directed. He reports some improvement in abdominal pain, he reports it is intermittent.  Mother has tried to make appointment with GI without success, waiting to 03/2023. She is requesting referral.       Review of Systems   Constitutional:  Negative for appetite change, chills, diaphoresis, fatigue and fever.   Respiratory:  Negative for cough, chest tightness, shortness of breath and wheezing.    Cardiovascular:  Negative for chest pain and palpitations.   Gastrointestinal:  Positive for abdominal pain (lower abdominal). Negative for blood in stool, constipation, diarrhea, nausea  and vomiting.   Genitourinary:  Negative for dysuria, flank pain, frequency and hematuria.      Objective   Vitals:    11/28/23 1507   BP: 120/70   Pulse: 75   Temp: 36.3 °C (97.4 °F)   SpO2: 97%      Body mass index is 35.01 kg/m².  Physical Exam  Constitutional:       General: He is not in acute distress.     Appearance: Normal appearance. He is not ill-appearing.   Cardiovascular:      Rate and Rhythm: Normal rate and regular rhythm.      Heart sounds: No murmur heard.  Pulmonary:      Effort: Pulmonary effort is normal.      Breath sounds: Normal breath sounds.   Abdominal:      General: Abdomen is flat. Bowel sounds are normal. There is no distension.      Palpations: Abdomen is soft. There is no mass.      Tenderness: There is abdominal tenderness (lower abdominal). There is no right CVA tenderness, left CVA tenderness, guarding or rebound.   Skin:     General: Skin is warm and dry.   Neurological:      Mental Status: He is alert and oriented to person, place, and time. Mental status is at baseline.   Psychiatric:         Mood and Affect: Mood normal.         Behavior: Behavior normal.       Diagnostic Results   Lab Results   Component Value Date    GLUCOSE 107 (H) 11/24/2023    CALCIUM 9.8 11/24/2023     11/24/2023    K 3.9 11/24/2023    CO2 22 (L) 11/24/2023     11/24/2023    BUN 11 11/24/2023    CREATININE 0.90 11/24/2023     Lab Results   Component Value Date    ALT 30 11/24/2023    AST 27 11/24/2023    ALKPHOS 86 11/24/2023    BILITOT 0.6 11/24/2023     Lab Results   Component Value Date    WBC 8.3 11/24/2023    HGB 16.0 11/24/2023    HCT 46.8 11/24/2023    MCV 85 11/24/2023     11/24/2023     Lab Results   Component Value Date    CHOL 181 06/03/2023    CHOL 214 (H) 05/14/2022    CHOL 197 11/23/2020     Lab Results   Component Value Date    HDL 28.1 (A) 06/03/2023    HDL 29.2 (A) 05/14/2022    HDL 31 (L) 11/23/2020     Lab Results   Component Value Date    LDLCALC 119 11/23/2020     Lab  "Results   Component Value Date    TRIG 279 (H) 06/03/2023    TRIG 400 (H) 05/14/2022    TRIG 237 (H) 11/23/2020     No components found for: \"CHOLHDL\"  Lab Results   Component Value Date    HGBA1C 5.3 11/23/2020     Other labs not included in the list above were reviewed either before or during this encounter.    History    Past Medical History:   Diagnosis Date    Allergy status to unspecified drugs, medicaments and biological substances 10/05/2015    History of seasonal allergies    Allergy to other foods 10/05/2015    History of food allergy     No past surgical history on file.  Family History   Problem Relation Name Age of Onset    Arthritis Mother      Hypertension Mother      No Known Problems Father      Colon cancer Maternal Grandmother      Arthritis Maternal Grandmother      Hypothyroidism Maternal Grandmother      Hyperlipidemia Maternal Grandfather      Colon cancer Paternal Grandfather      Breast cancer Other aunt      Social History     Socioeconomic History    Marital status: Single     Spouse name: Not on file    Number of children: Not on file    Years of education: Not on file    Highest education level: Not on file   Occupational History    Not on file   Tobacco Use    Smoking status: Never    Smokeless tobacco: Never   Substance and Sexual Activity    Alcohol use: Never    Drug use: Never    Sexual activity: Not on file   Other Topics Concern    Not on file   Social History Narrative    Not on file     Social Determinants of Health     Financial Resource Strain: Not on file   Food Insecurity: Not on file   Transportation Needs: Not on file   Physical Activity: Not on file   Stress: Not on file   Social Connections: Not on file   Intimate Partner Violence: Not on file   Housing Stability: Not on file     No Known Allergies  Current Outpatient Medications on File Prior to Visit   Medication Sig Dispense Refill    albuterol (ProAir HFA) 90 mcg/actuation inhaler Inhale 2 puffs every 4 hours if " needed for wheezing or shortness of breath. 8.5 g 3    dicyclomine (Bentyl) 20 mg tablet Take 1 tablet (20 mg) by mouth 2 times a day for 10 days. 20 tablet 0    multivit-min/iron/folic acid/K (BARIATRIC MULTIVITAMINS ORAL) 1 capsule once every 24 hours.      omeprazole (PriLOSEC) 20 mg DR capsule Take 1 capsule (20 mg) by mouth once daily. Do not crush or chew. 30 capsule 0    testosterone (Androderm) 2 mg/24 hour       clindamycin (Cleocin) 150 mg capsule       ondansetron (Zofran) 4 mg/5 mL solution Take 5 mL (4 mg) by mouth 2 times a day as needed for nausea or vomiting. (Patient not taking: Reported on 11/28/2023) 50 mL 0    testosterone (Androderm) 4 mg/24 hr once every 24 hours.  2 patches Transdermal Once a day       No current facility-administered medications on file prior to visit.     Immunization History   Administered Date(s) Administered    DTP 1994, 1994, 01/08/1995, 01/16/1996, 07/21/1999    DTaP vaccine, pediatric  (INFANRIX) 01/16/1996, 07/21/1999    Flu vaccine (IIV4), preservative free *Check age/dose* 10/17/2017, 10/11/2018    Flu vaccine, quadrivalent, recombinant, preservative free, adult (FLUBLOK) 10/07/2021    Hepatitis A vaccine, pediatric/adolescent (HAVRIX, VAQTA) 03/14/2012, 10/27/2012    Hepatitis B vaccine, pediatric/adolescent (RECOMBIVAX, ENGERIX) 1994, 1994, 04/15/1998    Influenza Whole 10/07/1996, 11/01/1996, 10/19/1999, 10/25/2000, 11/02/2001, 10/18/2002, 10/30/2003, 10/22/2007, 09/14/2009    Influenza, Unspecified 11/10/2010, 10/27/2012    Influenza, injectable, MDCK, preservative free, quadrivalent 10/05/2019, 10/06/2022    Influenza, injectable, quadrivalent 10/29/2014, 10/14/2015, 10/13/2020    Influenza, seasonal, injectable 09/11/2013, 10/17/2016    MMR vaccine, subcutaneous (MMR II) 10/23/1995, 09/02/1998    Meningococcal ACWY, unspecified 08/23/2007, 08/05/2008    Meningococcal MCV4P 08/23/2007    Moderna SARS-CoV-2 Vaccination 02/05/2021,  03/05/2021, 11/03/2021    Novel influenza-H1N1-09, preservative-free 11/22/2009    PPD Test 08/31/2015    Poliovirus vaccine, subcutaneous (IPOL) 1994, 1994, 01/16/1996, 07/21/1999    Td vaccine, age 7 years and older (TENIVAC) 08/23/2007    Tdap vaccine, age 7 year and older (BOOSTRIX) 08/05/2008, 10/13/2020    Varicella vaccine, subcutaneous (VARIVAX) 07/18/1995, 08/05/2008     Patient's medical history was reviewed and updated either before or during this encounter.       Dena Gallagher, APRN-CNP

## 2023-12-15 ENCOUNTER — ANCILLARY PROCEDURE (OUTPATIENT)
Dept: RADIOLOGY | Facility: CLINIC | Age: 28
End: 2023-12-15
Payer: COMMERCIAL

## 2023-12-15 DIAGNOSIS — R10.33 PERIUMBILICAL PAIN: ICD-10-CM

## 2023-12-15 PROCEDURE — 76705 ECHO EXAM OF ABDOMEN: CPT

## 2024-01-17 ENCOUNTER — LAB (OUTPATIENT)
Dept: LAB | Facility: LAB | Age: 29
End: 2024-01-17
Payer: COMMERCIAL

## 2024-01-17 DIAGNOSIS — R10.33 PERIUMBILICAL PAIN: Primary | ICD-10-CM

## 2024-01-17 PROCEDURE — 87449 NOS EACH ORGANISM AG IA: CPT

## 2024-01-19 LAB — H PYLORI AG STL QL IA: NEGATIVE

## 2024-02-12 DIAGNOSIS — Q98.4 KLINEFELTER SYNDROME, UNSPECIFIED (HHS-HCC): Primary | ICD-10-CM

## 2024-02-12 RX ORDER — TESTOSTERONE 20.25 MG/1.25G
2 GEL TOPICAL DAILY
Qty: 225 G | Refills: 1 | Status: SHIPPED | OUTPATIENT
Start: 2024-02-12 | End: 2024-05-17 | Stop reason: SDUPTHER

## 2024-02-23 ENCOUNTER — HOSPITAL ENCOUNTER (OUTPATIENT)
Dept: RADIOLOGY | Facility: HOSPITAL | Age: 29
Discharge: HOME | End: 2024-02-23
Payer: COMMERCIAL

## 2024-02-23 DIAGNOSIS — R10.33 PERIUMBILICAL PAIN: ICD-10-CM

## 2024-02-23 PROCEDURE — 78227 HEPATOBIL SYST IMAGE W/DRUG: CPT

## 2024-02-23 PROCEDURE — 2500000004 HC RX 250 GENERAL PHARMACY W/ HCPCS (ALT 636 FOR OP/ED): Performed by: INTERNAL MEDICINE

## 2024-02-23 PROCEDURE — 3430000001 HC RX 343 DIAGNOSTIC RADIOPHARMACEUTICALS: Performed by: INTERNAL MEDICINE

## 2024-02-23 PROCEDURE — A9537 TC99M MEBROFENIN: HCPCS | Performed by: INTERNAL MEDICINE

## 2024-02-23 RX ORDER — SINCALIDE 5 UG/5ML
2.2 INJECTION, POWDER, LYOPHILIZED, FOR SOLUTION INTRAVENOUS
Status: COMPLETED | OUTPATIENT
Start: 2024-02-23 | End: 2024-02-23

## 2024-02-23 RX ORDER — KIT FOR THE PREPARATION OF TECHNETIUM TC 99M MEBROFENIN 45 MG/10ML
6 INJECTION, POWDER, LYOPHILIZED, FOR SOLUTION INTRAVENOUS
Status: COMPLETED | OUTPATIENT
Start: 2024-02-23 | End: 2024-02-23

## 2024-02-23 RX ADMIN — SINCALIDE 2.2 MCG: 5 INJECTION, POWDER, LYOPHILIZED, FOR SOLUTION INTRAVENOUS at 08:54

## 2024-02-23 RX ADMIN — KIT FOR THE PREPARATION OF TECHNETIUM TC 99M MEBROFENIN 6 MILLICURIE: 45 INJECTION, POWDER, LYOPHILIZED, FOR SOLUTION INTRAVENOUS at 07:50

## 2024-04-11 ENCOUNTER — LAB (OUTPATIENT)
Dept: LAB | Facility: LAB | Age: 29
End: 2024-04-11
Payer: COMMERCIAL

## 2024-04-11 DIAGNOSIS — E29.1 HYPOGONADISM IN MALE: ICD-10-CM

## 2024-04-11 LAB
ERYTHROCYTE [DISTWIDTH] IN BLOOD BY AUTOMATED COUNT: 13.4 % (ref 11.5–14.5)
HCT VFR BLD AUTO: 44.7 % (ref 41–52)
HGB BLD-MCNC: 15.3 G/DL (ref 13.5–17.5)
MCH RBC QN AUTO: 29 PG (ref 26–34)
MCHC RBC AUTO-ENTMCNC: 34.2 G/DL (ref 32–36)
MCV RBC AUTO: 85 FL (ref 80–100)
NRBC BLD-RTO: 0 /100 WBCS (ref 0–0)
PLATELET # BLD AUTO: 252 X10*3/UL (ref 150–450)
RBC # BLD AUTO: 5.28 X10*6/UL (ref 4.5–5.9)
TESTOST SERPL-MCNC: 393 NG/DL (ref 240–1000)
WBC # BLD AUTO: 11.5 X10*3/UL (ref 4.4–11.3)

## 2024-04-11 PROCEDURE — 85027 COMPLETE CBC AUTOMATED: CPT

## 2024-04-11 PROCEDURE — 84403 ASSAY OF TOTAL TESTOSTERONE: CPT

## 2024-04-11 PROCEDURE — 36415 COLL VENOUS BLD VENIPUNCTURE: CPT

## 2024-04-15 ENCOUNTER — OFFICE VISIT (OUTPATIENT)
Dept: ENDOCRINOLOGY | Facility: CLINIC | Age: 29
End: 2024-04-15
Payer: COMMERCIAL

## 2024-04-15 VITALS
WEIGHT: 238 LBS | HEART RATE: 78 BPM | DIASTOLIC BLOOD PRESSURE: 84 MMHG | BODY MASS INDEX: 34.15 KG/M2 | SYSTOLIC BLOOD PRESSURE: 130 MMHG

## 2024-04-15 DIAGNOSIS — Q98.4 KLINEFELTER SYNDROME, UNSPECIFIED (HHS-HCC): Primary | ICD-10-CM

## 2024-04-15 PROCEDURE — 1036F TOBACCO NON-USER: CPT | Performed by: INTERNAL MEDICINE

## 2024-04-15 PROCEDURE — 99213 OFFICE O/P EST LOW 20 MIN: CPT | Performed by: INTERNAL MEDICINE

## 2024-04-15 ASSESSMENT — PAIN SCALES - GENERAL: PAINLEVEL: 0-NO PAIN

## 2024-04-15 ASSESSMENT — ENCOUNTER SYMPTOMS
FATIGUE: 0
DIARRHEA: 0
NERVOUS/ANXIOUS: 0
UNEXPECTED WEIGHT CHANGE: 0
HEADACHES: 0
SHORTNESS OF BREATH: 0
FEVER: 0
NAUSEA: 0
TROUBLE SWALLOWING: 0
ABDOMINAL PAIN: 1
TREMORS: 0
VOMITING: 0
CONSTIPATION: 0
PALPITATIONS: 0
NECK PAIN: 0
WEAKNESS: 0

## 2024-04-15 NOTE — PATIENT INSTRUCTIONS
RECOMMENDATIONS  Continue Androgel 1.62%, two pumps per day    Follow up 6 months  Repeat labs before next appointment

## 2024-04-15 NOTE — PROGRESS NOTES
History Of Present Illness  Boaz Ramirez is a 28 y.o. male  Klinefelter's syndrome.   Diagnosed at age 8.    Started testosterone replacement around age 12.    Androderm has recently gone out of production.   Change to Androgel 1.62%, two pumps per day    No complaints.     Past Medical History  He has a past medical history of Allergy status to unspecified drugs, medicaments and biological substances (10/05/2015) and Allergy to other foods (10/05/2015).    Surgical History  He has no past surgical history on file.     Social History  He reports that he has never smoked. He has never used smokeless tobacco. He reports that he does not drink alcohol and does not use drugs.    Family History  Family History   Problem Relation Name Age of Onset    Arthritis Mother      Hypertension Mother      No Known Problems Father      Colon cancer Maternal Grandmother      Arthritis Maternal Grandmother      Hypothyroidism Maternal Grandmother      Hyperlipidemia Maternal Grandfather      Colon cancer Paternal Grandfather      Breast cancer Other aunt        Allergies  Patient has no known allergies.    Review of Systems   Constitutional:  Negative for fatigue, fever and unexpected weight change.   HENT:  Negative for trouble swallowing.    Eyes:  Negative for visual disturbance.   Respiratory:  Negative for shortness of breath.    Cardiovascular:  Negative for chest pain and palpitations.   Gastrointestinal:  Positive for abdominal pain. Negative for constipation, diarrhea, nausea and vomiting.   Endocrine: Negative for cold intolerance and heat intolerance.   Musculoskeletal:  Negative for neck pain.   Skin:  Negative for rash.   Neurological:  Negative for tremors, weakness and headaches.   Psychiatric/Behavioral:  The patient is not nervous/anxious.          Last Recorded Vitals  Blood pressure 130/84, pulse 78, weight 108 kg (238 lb).    Physical Exam  Constitutional:       General: He is not in acute  distress.  Neurological:      Mental Status: He is alert.          Relevant Results   Latest Reference Range & Units 04/11/24 07:26   Testosterone 240 - 1,000 ng/dL 393   LEUKOCYTES (10*3/UL) IN BLOOD BY AUTOMATED COUNT, Sudanese 4.4 - 11.3 x10*3/uL 11.5 (H)   nRBC 0.0 - 0.0 /100 WBCs 0.0   ERYTHROCYTES (10*6/UL) IN BLOOD BY AUTOMATED COUNT, Sudanese 4.50 - 5.90 x10*6/uL 5.28   HEMOGLOBIN 13.5 - 17.5 g/dL 15.3   HEMATOCRIT 41.0 - 52.0 % 44.7   MCV 80 - 100 fL 85   MCH 26.0 - 34.0 pg 29.0   MCHC 32.0 - 36.0 g/dL 34.2   RED CELL DISTRIBUTION WIDTH 11.5 - 14.5 % 13.4   PLATELETS (10*3/UL) IN BLOOD AUTOMATED COUNT, Sudanese 150 - 450 x10*3/uL 252         IMPRESSION  KLINEFELTER'S SYNDROME  HYPOGONADISM  Testosterone in normal range on current replacement  Good compliance with daily gel.       RECOMMENDATIONS  Continue Androgel 1.62%, two pumps per day    Follow up 6 months  Repeat labs before next appointment

## 2024-04-16 ASSESSMENT — ENCOUNTER SYMPTOMS
DIARRHEA: 0
NAUSEA: 0
APPETITE CHANGE: 0
SHORTNESS OF BREATH: 0
HEADACHES: 0
VOMITING: 0
ABDOMINAL PAIN: 0
CHILLS: 0
COUGH: 0
FEVER: 0

## 2024-04-16 NOTE — PROGRESS NOTES
[facility]: [facility]  MEDICARE WELLNESS EXAM      Boaz Ramirez is a 28 y.o. male that is presenting today for Annual Exam.    Assessment/Plan    Diagnoses and all orders for this visit:  Annual physical exam  Mild intermittent asthma, unspecified whether complicated (HHS-HCC)  Mixed hyperlipidemia  Klinefelter syndrome, unspecified (HHS-HCC)  Hypogonadism in male  Hiatal hernia  -     esomeprazole (NexIUM Packet) 40 mg packet; Take 40 mg by mouth once daily in the morning. Take before meals.  Encounter for routine laboratory testing  -     CBC and Auto Differential; Future  -     Comprehensive Metabolic Panel; Future  -     Lipid Panel; Future  -     Hemoglobin A1C; Future  -     Vitamin D 25-Hydroxy,Total (for eval of Vitamin D levels); Future  -     TSH with reflex to Free T4 if abnormal; Future  Vitamin D deficiency  -     Vitamin D 25-Hydroxy,Total (for eval of Vitamin D levels); Future  Generalized abdominal pain  -     CBC and Auto Differential; Future  Dyslipidemia  -     Lipid Panel; Future  Hyperglycemia  -     Hemoglobin A1C; Future  Other fatigue  -     TSH with reflex to Free T4 if abnormal; Future  Other orders  -     Follow Up In Primary Care - Health Maintenance; Future    ?Some intermittent dyspepsia assoc w/ the hiatal hernia?  Trial of alternate PPI.  Has has a hard time with pills.    Subjective   HPI  Reviewed the extensive workup for intermittent abominal pain - CT, RUQ US, HIDA, EGD, Colonoscopy.  Small hiatal hernia seen.  Sometimes when he urinates he gets the lower abdominal pressure.    Review of Systems   Constitutional:  Negative for appetite change, chills and fever.   Respiratory:  Negative for cough and shortness of breath.    Cardiovascular:  Negative for chest pain.   Gastrointestinal:  Negative for abdominal pain, diarrhea, nausea and vomiting.   Neurological:  Negative for headaches.   All other systems reviewed and are negative.    Objective   Vitals:     04/18/24 1549   BP: 124/72   Pulse: 78   SpO2: 96%      Body mass index is 33.72 kg/m².  Physical Exam  Vitals reviewed.   Constitutional:       General: He is not in acute distress.     Appearance: He is not toxic-appearing.   HENT:      Head: Normocephalic and atraumatic.      Mouth/Throat:      Mouth: Mucous membranes are moist.   Eyes:      Pupils: Pupils are equal, round, and reactive to light.   Cardiovascular:      Rate and Rhythm: Normal rate and regular rhythm.      Heart sounds: No murmur heard.  Pulmonary:      Breath sounds: Normal breath sounds. No wheezing, rhonchi or rales.   Abdominal:      General: There is no distension.      Palpations: Abdomen is soft.      Hernia: There is no hernia in the left inguinal area or right inguinal area.   Genitourinary:     Penis: Circumcised.       Testes:         Right: Mass or tenderness not present.         Left: Mass or tenderness not present.   Musculoskeletal:      Right lower leg: No edema.      Left lower leg: No edema.   Neurological:      General: No focal deficit present.      Mental Status: He is alert and oriented to person, place, and time.       Diagnostic Results   Lab Results   Component Value Date    GLUCOSE 107 (H) 11/24/2023    CALCIUM 9.8 11/24/2023     11/24/2023    K 3.9 11/24/2023    CO2 22 (L) 11/24/2023     11/24/2023    BUN 11 11/24/2023    CREATININE 0.90 11/24/2023     Lab Results   Component Value Date    ALT 30 11/24/2023    AST 27 11/24/2023    ALKPHOS 86 11/24/2023    BILITOT 0.6 11/24/2023     Lab Results   Component Value Date    WBC 11.5 (H) 04/11/2024    HGB 15.3 04/11/2024    HCT 44.7 04/11/2024    MCV 85 04/11/2024     04/11/2024     Lab Results   Component Value Date    CHOL 181 06/03/2023    CHOL 214 (H) 05/14/2022    CHOL 197 11/23/2020     Lab Results   Component Value Date    HDL 28.1 (A) 06/03/2023    HDL 29.2 (A) 05/14/2022    HDL 31 (L) 11/23/2020     Lab Results   Component Value Date    LDLCALC  "119 11/23/2020     Lab Results   Component Value Date    TRIG 279 (H) 06/03/2023    TRIG 400 (H) 05/14/2022    TRIG 237 (H) 11/23/2020     No components found for: \"CHOLHDL\"  Lab Results   Component Value Date    HGBA1C 5.3 11/23/2020     Other labs not included in the list above reviewed either before or during this encounter.    History   Past Medical History:   Diagnosis Date    Allergy status to unspecified drugs, medicaments and biological substances 10/05/2015    History of seasonal allergies    Allergy to other foods 10/05/2015    History of food allergy     History reviewed. No pertinent surgical history.  Family History   Problem Relation Name Age of Onset    Arthritis Mother      Hypertension Mother      No Known Problems Father      Colon cancer Maternal Grandmother      Arthritis Maternal Grandmother      Hypothyroidism Maternal Grandmother      Hyperlipidemia Maternal Grandfather      Colon cancer Paternal Grandfather      Breast cancer Other aunt      Social History     Socioeconomic History    Marital status: Single     Spouse name: Not on file    Number of children: Not on file    Years of education: Not on file    Highest education level: Not on file   Occupational History    Not on file   Tobacco Use    Smoking status: Never    Smokeless tobacco: Never   Vaping Use    Vaping status: Never Used   Substance and Sexual Activity    Alcohol use: Never    Drug use: Never    Sexual activity: Not on file   Other Topics Concern    Not on file   Social History Narrative    Not on file     Social Determinants of Health     Financial Resource Strain: Not on file   Food Insecurity: Not on file   Transportation Needs: Not on file   Physical Activity: Not on file   Stress: Not on file   Social Connections: Not on file   Intimate Partner Violence: Not on file   Housing Stability: Not on file     No Known Allergies  Current Outpatient Medications on File Prior to Visit   Medication Sig Dispense Refill    albuterol " (ProAir HFA) 90 mcg/actuation inhaler Inhale 2 puffs every 4 hours if needed for wheezing or shortness of breath. 8.5 g 3    multivit-min/iron/folic acid/K (BARIATRIC MULTIVITAMINS ORAL) 1 capsule once every 24 hours.      testosterone (AndroGeL) 20.25 mg/1.25 gram (1.62 %) gel in metered-dose pump Place 2 Pump on the skin once daily. 225 g 1    omeprazole (PriLOSEC) 20 mg DR capsule Take 1 capsule (20 mg) by mouth once daily. Do not crush or chew. 30 capsule 0    [DISCONTINUED] clindamycin (Cleocin) 150 mg capsule       [DISCONTINUED] ondansetron (Zofran) 4 mg/5 mL solution Take 5 mL (4 mg) by mouth 2 times a day as needed for nausea or vomiting. (Patient not taking: Reported on 4/15/2024) 50 mL 0     No current facility-administered medications on file prior to visit.     Immunization History   Administered Date(s) Administered    DTP 1995, 1995, 01/08/1995, 01/16/1996, 07/21/1999    DTaP vaccine, pediatric  (INFANRIX) 01/16/1996, 07/21/1999    Flu vaccine (IIV4), preservative free *Check age/dose* 10/17/2017, 10/11/2018    Flu vaccine, quadrivalent, no egg protein, age 6 month or greater (FLUCELVAX) 10/05/2019, 10/06/2022    Flu vaccine, quadrivalent, recombinant, preservative free, adult (FLUBLOK) 10/07/2021    Hepatitis A vaccine, pediatric/adolescent (HAVRIX, VAQTA) 03/14/2012, 10/27/2012    Hepatitis B vaccine, pediatric/adolescent (RECOMBIVAX, ENGERIX) 1995, 1994, 04/15/1998    Influenza Whole 10/07/1996, 11/01/1996, 10/19/1999, 10/25/2000, 11/02/2001, 10/18/2002, 10/30/2003, 10/22/2007, 09/14/2009    Influenza, Unspecified 11/10/2010, 10/27/2012    Influenza, injectable, quadrivalent 10/29/2014, 10/14/2015, 10/13/2020    Influenza, seasonal, injectable 09/11/2013, 10/17/2016    MMR vaccine, subcutaneous (MMR II) 10/23/1995, 09/02/1998    Meningococcal ACWY, unspecified 08/23/2007, 08/05/2008    Meningococcal MCV4P 08/23/2007    Moderna COVID-19 vaccine, Fall 2023, 12 yeasrs and older  (50mcg/0.5mL) 10/26/2023    Moderna SARS-CoV-2 Vaccination 02/05/2021, 03/05/2021, 11/03/2021    Novel influenza-H1N1-09, preservative-free 11/22/2009    PPD Test 08/31/2015    Poliovirus vaccine, subcutaneous (IPOL) 1995, 1994, 01/16/1996, 07/21/1999    Td vaccine, age 7 years and older (TENIVAC) 08/23/2007    Tdap vaccine, age 7 year and older (BOOSTRIX, ADACEL) 08/05/2008, 10/13/2020    Varicella vaccine, subcutaneous (VARIVAX) 07/18/1995, 08/05/2008     Patient's medical history was reviewed and updated either before or during this encounter.     Austin Youssef MD

## 2024-04-18 ENCOUNTER — OFFICE VISIT (OUTPATIENT)
Dept: PRIMARY CARE | Facility: CLINIC | Age: 29
End: 2024-04-18
Payer: COMMERCIAL

## 2024-04-18 VITALS
BODY MASS INDEX: 33.64 KG/M2 | HEIGHT: 70 IN | WEIGHT: 235 LBS | DIASTOLIC BLOOD PRESSURE: 72 MMHG | HEART RATE: 78 BPM | OXYGEN SATURATION: 96 % | SYSTOLIC BLOOD PRESSURE: 124 MMHG

## 2024-04-18 DIAGNOSIS — R53.83 OTHER FATIGUE: ICD-10-CM

## 2024-04-18 DIAGNOSIS — R73.9 HYPERGLYCEMIA: ICD-10-CM

## 2024-04-18 DIAGNOSIS — E78.5 DYSLIPIDEMIA: ICD-10-CM

## 2024-04-18 DIAGNOSIS — E78.2 MIXED HYPERLIPIDEMIA: ICD-10-CM

## 2024-04-18 DIAGNOSIS — E55.9 VITAMIN D DEFICIENCY: ICD-10-CM

## 2024-04-18 DIAGNOSIS — Q98.4 KLINEFELTER SYNDROME, UNSPECIFIED (HHS-HCC): ICD-10-CM

## 2024-04-18 DIAGNOSIS — E29.1 HYPOGONADISM IN MALE: ICD-10-CM

## 2024-04-18 DIAGNOSIS — Z00.00 ANNUAL PHYSICAL EXAM: Primary | ICD-10-CM

## 2024-04-18 DIAGNOSIS — Z01.89 ENCOUNTER FOR ROUTINE LABORATORY TESTING: ICD-10-CM

## 2024-04-18 DIAGNOSIS — K44.9 HIATAL HERNIA: ICD-10-CM

## 2024-04-18 DIAGNOSIS — R10.84 GENERALIZED ABDOMINAL PAIN: ICD-10-CM

## 2024-04-18 DIAGNOSIS — J45.20 MILD INTERMITTENT ASTHMA, UNSPECIFIED WHETHER COMPLICATED (HHS-HCC): ICD-10-CM

## 2024-04-18 PROCEDURE — 99385 PREV VISIT NEW AGE 18-39: CPT | Performed by: INTERNAL MEDICINE

## 2024-04-18 PROCEDURE — 1036F TOBACCO NON-USER: CPT | Performed by: INTERNAL MEDICINE

## 2024-04-18 PROCEDURE — 99395 PREV VISIT EST AGE 18-39: CPT | Performed by: INTERNAL MEDICINE

## 2024-04-18 RX ORDER — ESOMEPRAZOLE MAGNESIUM 40 MG/1
40 GRANULE, DELAYED RELEASE ORAL
Qty: 30 PACKET | Refills: 11 | Status: SHIPPED | OUTPATIENT
Start: 2024-04-18 | End: 2025-04-18

## 2024-04-18 ASSESSMENT — PAIN SCALES - GENERAL: PAINLEVEL: 0-NO PAIN

## 2024-04-18 ASSESSMENT — PATIENT HEALTH QUESTIONNAIRE - PHQ9
1. LITTLE INTEREST OR PLEASURE IN DOING THINGS: NOT AT ALL
SUM OF ALL RESPONSES TO PHQ9 QUESTIONS 1 AND 2: 0
2. FEELING DOWN, DEPRESSED OR HOPELESS: NOT AT ALL

## 2024-05-08 ENCOUNTER — TELEPHONE (OUTPATIENT)
Dept: ENDOCRINOLOGY | Facility: CLINIC | Age: 29
End: 2024-05-08
Payer: COMMERCIAL

## 2024-05-08 NOTE — TELEPHONE ENCOUNTER
Called and spoke to Alina (mom) and asked if we could sent the testosterone gel to an alternative pharm due to [facility] being out of stock. Per Alina marin to use the local [facility] in [place] on file

## 2024-05-10 DIAGNOSIS — Q98.4 KLINEFELTER SYNDROME, UNSPECIFIED (HHS-HCC): ICD-10-CM

## 2024-05-17 RX ORDER — TESTOSTERONE 20.25 MG/1.25G
2 GEL TOPICAL DAILY
Qty: 225 G | Refills: 1 | Status: SHIPPED | OUTPATIENT
Start: 2024-05-17 | End: 2024-08-15

## 2024-07-05 ENCOUNTER — CLINICAL SUPPORT (OUTPATIENT)
Dept: PRIMARY CARE | Facility: CLINIC | Age: 29
End: 2024-07-05
Payer: COMMERCIAL

## 2024-07-05 DIAGNOSIS — H61.23 IMPACTED CERUMEN, BILATERAL: ICD-10-CM

## 2024-07-05 PROCEDURE — 69209 REMOVE IMPACTED EAR WAX UNI: CPT | Mod: 50 | Performed by: INTERNAL MEDICINE

## 2024-07-05 NOTE — PROGRESS NOTES
Patient ID: Boaz Ramirez is a 28 y.o. male.    Ear Cerumen Removal    Date/Time: 7/5/2024 1:03 PM    Performed by: Therese Gilbert LPN  Authorized by: Austin Youssef MD    Consent:     Consent obtained:  Verbal    Consent given by:  Patient  Universal protocol:     Procedure explained and questions answered to patient or proxy's satisfaction: yes      Relevant documents present and verified: no      Test results available: no      Imaging studies available: no      Required blood products, implants, devices, and special equipment available: no      Site/side marked: no      Immediately prior to procedure, a time out was called: no      Patient identity confirmed:  Verbally with patient  Procedure details:     Location:  L ear and R ear    Procedure type: irrigation      Procedure outcomes: cerumen removed    Post-procedure details:     Inspection:  Some cerumen remaining    Procedure completion:  Tolerated

## 2024-09-28 ENCOUNTER — PATIENT MESSAGE (OUTPATIENT)
Dept: PRIMARY CARE | Facility: CLINIC | Age: 29
End: 2024-09-28
Payer: COMMERCIAL

## 2024-09-28 DIAGNOSIS — T78.40XD ALLERGIC REACTION, SUBSEQUENT ENCOUNTER: Primary | ICD-10-CM

## 2024-09-30 RX ORDER — EPINEPHRINE 0.3 MG/.3ML
1 INJECTION SUBCUTANEOUS ONCE AS NEEDED
Qty: 1 EACH | Refills: 0 | Status: SHIPPED | OUTPATIENT
Start: 2024-09-30

## 2024-10-01 ENCOUNTER — LAB (OUTPATIENT)
Dept: LAB | Facility: LAB | Age: 29
End: 2024-10-01
Payer: COMMERCIAL

## 2024-10-01 DIAGNOSIS — Q98.4 KLINEFELTER SYNDROME, UNSPECIFIED (HHS-HCC): ICD-10-CM

## 2024-10-01 LAB
ERYTHROCYTE [DISTWIDTH] IN BLOOD BY AUTOMATED COUNT: 12.9 % (ref 11.5–14.5)
HCT VFR BLD AUTO: 45.8 % (ref 41–52)
HGB BLD-MCNC: 15.4 G/DL (ref 13.5–17.5)
MCH RBC QN AUTO: 29.3 PG (ref 26–34)
MCHC RBC AUTO-ENTMCNC: 33.6 G/DL (ref 32–36)
MCV RBC AUTO: 87 FL (ref 80–100)
NRBC BLD-RTO: 0 /100 WBCS (ref 0–0)
PLATELET # BLD AUTO: 231 X10*3/UL (ref 150–450)
RBC # BLD AUTO: 5.26 X10*6/UL (ref 4.5–5.9)
TESTOST SERPL-MCNC: 140 NG/DL (ref 240–1000)
WBC # BLD AUTO: 10.1 X10*3/UL (ref 4.4–11.3)

## 2024-10-01 PROCEDURE — 36415 COLL VENOUS BLD VENIPUNCTURE: CPT

## 2024-10-01 PROCEDURE — 84403 ASSAY OF TOTAL TESTOSTERONE: CPT

## 2024-10-01 PROCEDURE — 85027 COMPLETE CBC AUTOMATED: CPT

## 2024-10-14 ENCOUNTER — APPOINTMENT (OUTPATIENT)
Dept: ENDOCRINOLOGY | Facility: CLINIC | Age: 29
End: 2024-10-14
Payer: COMMERCIAL

## 2024-10-14 VITALS
HEART RATE: 82 BPM | DIASTOLIC BLOOD PRESSURE: 79 MMHG | BODY MASS INDEX: 35.73 KG/M2 | SYSTOLIC BLOOD PRESSURE: 120 MMHG | WEIGHT: 249 LBS

## 2024-10-14 DIAGNOSIS — Q98.4 KLINEFELTER SYNDROME, UNSPECIFIED (HHS-HCC): Primary | ICD-10-CM

## 2024-10-14 PROCEDURE — 99213 OFFICE O/P EST LOW 20 MIN: CPT | Performed by: INTERNAL MEDICINE

## 2024-10-14 PROCEDURE — 1036F TOBACCO NON-USER: CPT | Performed by: INTERNAL MEDICINE

## 2024-10-14 ASSESSMENT — ENCOUNTER SYMPTOMS
TROUBLE SWALLOWING: 0
FATIGUE: 0
NECK PAIN: 0
NERVOUS/ANXIOUS: 0
DIARRHEA: 0
SHORTNESS OF BREATH: 0
HEADACHES: 0
NAUSEA: 0
CONSTIPATION: 0
WEAKNESS: 0
TREMORS: 0
VOMITING: 0
PALPITATIONS: 0
FEVER: 0
UNEXPECTED WEIGHT CHANGE: 0
ABDOMINAL PAIN: 0

## 2024-10-14 NOTE — PATIENT INSTRUCTIONS
RECOMMENDATIONS  Androgel 1.62%, apply three pumps to upper arm once daily.    Follow up 6 months  Repeat labs before next appointment

## 2024-10-14 NOTE — LETTER
October 14, 2024     Austin Youssef MD  [address] [place] [address]  [address]  [place] [address]    Patient: Boaz Ramirez   YOB: 1995   Date of Visit: 10/14/2024       Dear Dr. Austin Youssef MD:    Thank you for referring Boaz Ramirez to me for evaluation. Below are my notes for this consultation.  If you have questions, please do not hesitate to call me. I look forward to following your patient along with you.       Sincerely,     Piotr Mccullough MD      CC: No Recipients  ______________________________________________________________________________________    History Of Present Illness  Boaz Ramirez is a 29 y.o. male   Klinefelter's syndrome.   Diagnosed at age 8.    Started testosterone replacement around age 12.      Androgel 1.62%, three pumps per day, applies to thigh     No complaints.     Past Medical History  He has a past medical history of Allergy status to unspecified drugs, medicaments and biological substances (10/05/2015) and Allergy to other foods (10/05/2015).    Surgical History  He has no past surgical history on file.     Social History  He reports that he has never smoked. He has never used smokeless tobacco. He reports that he does not drink alcohol and does not use drugs.    Family History  Family History   Problem Relation Name Age of Onset   • Arthritis Mother     • Hypertension Mother     • No Known Problems Father     • Colon cancer Maternal Grandmother     • Arthritis Maternal Grandmother     • Hypothyroidism Maternal Grandmother     • Hyperlipidemia Maternal Grandfather     • Colon cancer Paternal Grandfather     • Breast cancer Other aunt        Medications  Current Outpatient Medications   Medication Instructions   • albuterol (ProAir HFA) 90 mcg/actuation inhaler 2 puffs, inhalation, Every 4 hours PRN   • EPINEPHrine (EPIPEN) 0.3 mg, intramuscular, Once as needed, Inject into upper leg. Call 911 after use.   • esomeprazole (NEXIUM PACKET) 40 mg, oral, Daily  before breakfast   • multivit-min/iron/folic acid/K (BARIATRIC MULTIVITAMINS ORAL) 1 capsule, Every 24 hours   • testosterone (AndroGeL) 20.25 mg/1.25 gram (1.62 %) gel in metered-dose pump 2 Pump, transdermal, Daily       Allergies  Tree nut    Review of Systems   Constitutional:  Negative for fatigue, fever and unexpected weight change.   HENT:  Negative for trouble swallowing.    Eyes:  Negative for visual disturbance.   Respiratory:  Negative for shortness of breath.    Cardiovascular:  Negative for chest pain and palpitations.   Gastrointestinal:  Negative for abdominal pain, constipation, diarrhea, nausea and vomiting.   Endocrine: Negative for cold intolerance and heat intolerance.   Musculoskeletal:  Negative for neck pain.   Skin:  Negative for rash.   Neurological:  Negative for tremors, weakness and headaches.   Psychiatric/Behavioral:  The patient is not nervous/anxious.          Last Recorded Vitals  Blood pressure 120/79, pulse 82, weight 113 kg (249 lb).    Physical Exam  Constitutional:       General: He is not in acute distress.  Neurological:      Mental Status: He is alert.   Psychiatric:         Mood and Affect: Affect normal.          Relevant Results   Latest Reference Range & Units 10/01/24 15:30   Testosterone 240 - 1,000 ng/dL 140 (L)   WBC 4.4 - 11.3 x10*3/uL 10.1   nRBC 0.0 - 0.0 /100 WBCs 0.0   RBC 4.50 - 5.90 x10*6/uL 5.26   HEMOGLOBIN 13.5 - 17.5 g/dL 15.4   HEMATOCRIT 41.0 - 52.0 % 45.8   MCV 80 - 100 fL 87   MCH 26.0 - 34.0 pg 29.3   MCHC 32.0 - 36.0 g/dL 33.6   RED CELL DISTRIBUTION WIDTH 11.5 - 14.5 % 12.9   Platelets 150 - 450 x10*3/uL 231       IMPRESSION  KLINEFELTER'S SYNDROME  HYPOGONADISM  Testosterone atypically low on current replacement  Good compliance with daily gel, but he is applying to thigh       RECOMMENDATIONS  Androgel 1.62%, apply three pumps to upper arm once daily.    Follow up 6 months  Repeat labs before next appointment

## 2024-10-14 NOTE — PROGRESS NOTES
History Of Present Illness  Boaz Ramirez is a 29 y.o. male   Klinefelter's syndrome.   Diagnosed at age 8.    Started testosterone replacement around age 12.      Androgel 1.62%, three pumps per day, applies to thigh     No complaints.     Past Medical History  He has a past medical history of Allergy status to unspecified drugs, medicaments and biological substances (10/05/2015) and Allergy to other foods (10/05/2015).    Surgical History  He has no past surgical history on file.     Social History  He reports that he has never smoked. He has never used smokeless tobacco. He reports that he does not drink alcohol and does not use drugs.    Family History  Family History   Problem Relation Name Age of Onset    Arthritis Mother      Hypertension Mother      No Known Problems Father      Colon cancer Maternal Grandmother      Arthritis Maternal Grandmother      Hypothyroidism Maternal Grandmother      Hyperlipidemia Maternal Grandfather      Colon cancer Paternal Grandfather      Breast cancer Other aunt        Medications  Current Outpatient Medications   Medication Instructions    albuterol (ProAir HFA) 90 mcg/actuation inhaler 2 puffs, inhalation, Every 4 hours PRN    EPINEPHrine (EPIPEN) 0.3 mg, intramuscular, Once as needed, Inject into upper leg. Call 911 after use.    esomeprazole (NEXIUM PACKET) 40 mg, oral, Daily before breakfast    multivit-min/iron/folic acid/K (BARIATRIC MULTIVITAMINS ORAL) 1 capsule, Every 24 hours    testosterone (AndroGeL) 20.25 mg/1.25 gram (1.62 %) gel in metered-dose pump 2 Pump, transdermal, Daily       Allergies  Tree nut    Review of Systems   Constitutional:  Negative for fatigue, fever and unexpected weight change.   HENT:  Negative for trouble swallowing.    Eyes:  Negative for visual disturbance.   Respiratory:  Negative for shortness of breath.    Cardiovascular:  Negative for chest pain and palpitations.   Gastrointestinal:  Negative for abdominal pain, constipation,  diarrhea, nausea and vomiting.   Endocrine: Negative for cold intolerance and heat intolerance.   Musculoskeletal:  Negative for neck pain.   Skin:  Negative for rash.   Neurological:  Negative for tremors, weakness and headaches.   Psychiatric/Behavioral:  The patient is not nervous/anxious.          Last Recorded Vitals  Blood pressure 120/79, pulse 82, weight 113 kg (249 lb).    Physical Exam  Constitutional:       General: He is not in acute distress.  Neurological:      Mental Status: He is alert.   Psychiatric:         Mood and Affect: Affect normal.          Relevant Results   Latest Reference Range & Units 10/01/24 15:30   Testosterone 240 - 1,000 ng/dL 140 (L)   WBC 4.4 - 11.3 x10*3/uL 10.1   nRBC 0.0 - 0.0 /100 WBCs 0.0   RBC 4.50 - 5.90 x10*6/uL 5.26   HEMOGLOBIN 13.5 - 17.5 g/dL 15.4   HEMATOCRIT 41.0 - 52.0 % 45.8   MCV 80 - 100 fL 87   MCH 26.0 - 34.0 pg 29.3   MCHC 32.0 - 36.0 g/dL 33.6   RED CELL DISTRIBUTION WIDTH 11.5 - 14.5 % 12.9   Platelets 150 - 450 x10*3/uL 231       IMPRESSION  KLINEFELTER'S SYNDROME  HYPOGONADISM  Testosterone atypically low on current replacement  Good compliance with daily gel, but he is applying to thigh       RECOMMENDATIONS  Androgel 1.62%, apply three pumps to upper arm once daily.    Follow up 6 months  Repeat labs before next appointment

## 2024-12-16 ENCOUNTER — APPOINTMENT (OUTPATIENT)
Dept: ALLERGY | Facility: CLINIC | Age: 29
End: 2024-12-16
Payer: COMMERCIAL

## 2024-12-23 ENCOUNTER — TELEPHONE (OUTPATIENT)
Dept: ENDOCRINOLOGY | Facility: CLINIC | Age: 29
End: 2024-12-23
Payer: COMMERCIAL

## 2024-12-23 DIAGNOSIS — Q98.4 KLINEFELTER SYNDROME, UNSPECIFIED (HHS-HCC): ICD-10-CM

## 2024-12-23 RX ORDER — TESTOSTERONE 20.25 MG/1.25G
GEL TOPICAL
Qty: 225 G | Refills: 0 | OUTPATIENT
Start: 2024-12-23

## 2024-12-23 RX ORDER — TESTOSTERONE 20.25 MG/1.25G
2 GEL TOPICAL DAILY
Qty: 225 G | Refills: 1 | Status: SHIPPED | OUTPATIENT
Start: 2024-12-23 | End: 2025-03-23

## 2024-12-23 NOTE — TELEPHONE ENCOUNTER
Alina called on her sons behalf and they are asking for a refill on his Testosterone to the [facility] in [place] (verified)

## 2024-12-26 NOTE — TELEPHONE ENCOUNTER
Called and LM for Lavonne that script sent and appointment scheduled and details for follow up visit in [facility]    ----- Message from Piotr Mccullough sent at 12/23/2024 12:37 PM EST -----  Regarding: RE: Refill req  Rx sent.  He needs an appointment  ----- Message -----  From: Anuradha Gardner MA  Sent: 12/23/2024  10:43 AM EST  To: Piotr Mccullough MD  Subject: Refill req                                       Alina called on her sons behalf and they are asking for a refill on his Testosterone to the [facility] in [place] (verified)     Thank you   Anuradha

## 2024-12-30 ENCOUNTER — APPOINTMENT (OUTPATIENT)
Dept: OPHTHALMOLOGY | Facility: CLINIC | Age: 29
End: 2024-12-30
Payer: COMMERCIAL

## 2025-01-02 ENCOUNTER — APPOINTMENT (OUTPATIENT)
Dept: OPHTHALMOLOGY | Facility: CLINIC | Age: 30
End: 2025-01-02
Payer: COMMERCIAL

## 2025-01-23 DIAGNOSIS — J45.20 MILD INTERMITTENT ASTHMA, UNSPECIFIED WHETHER COMPLICATED (HHS-HCC): ICD-10-CM

## 2025-01-23 RX ORDER — ALBUTEROL SULFATE 90 UG/1
2 INHALANT RESPIRATORY (INHALATION) EVERY 4 HOURS PRN
Qty: 8.5 G | Refills: 3 | Status: SHIPPED | OUTPATIENT
Start: 2025-01-23 | End: 2026-01-23

## 2025-01-29 ENCOUNTER — APPOINTMENT (OUTPATIENT)
Dept: ALLERGY | Facility: CLINIC | Age: 30
End: 2025-01-29
Payer: COMMERCIAL

## 2025-03-06 ENCOUNTER — APPOINTMENT (OUTPATIENT)
Dept: OPHTHALMOLOGY | Facility: CLINIC | Age: 30
End: 2025-03-06
Payer: COMMERCIAL

## 2025-03-12 ENCOUNTER — APPOINTMENT (OUTPATIENT)
Dept: ALLERGY | Facility: CLINIC | Age: 30
End: 2025-03-12
Payer: COMMERCIAL

## 2025-04-07 DIAGNOSIS — Q98.4 KLINEFELTER SYNDROME, UNSPECIFIED (HHS-HCC): ICD-10-CM

## 2025-04-14 ENCOUNTER — APPOINTMENT (OUTPATIENT)
Dept: ENDOCRINOLOGY | Facility: CLINIC | Age: 30
End: 2025-04-14
Payer: COMMERCIAL

## 2025-04-14 ENCOUNTER — APPOINTMENT (OUTPATIENT)
Dept: PRIMARY CARE | Facility: CLINIC | Age: 30
End: 2025-04-14
Payer: COMMERCIAL

## 2025-04-14 VITALS — HEIGHT: 70 IN | BODY MASS INDEX: 34.65 KG/M2 | WEIGHT: 242 LBS

## 2025-04-14 DIAGNOSIS — Q98.4 KLINEFELTER SYNDROME, UNSPECIFIED (HHS-HCC): ICD-10-CM

## 2025-04-14 PROCEDURE — 99213 OFFICE O/P EST LOW 20 MIN: CPT | Performed by: INTERNAL MEDICINE

## 2025-04-14 PROCEDURE — 1036F TOBACCO NON-USER: CPT | Performed by: INTERNAL MEDICINE

## 2025-04-14 PROCEDURE — G2211 COMPLEX E/M VISIT ADD ON: HCPCS | Performed by: INTERNAL MEDICINE

## 2025-04-14 PROCEDURE — 3008F BODY MASS INDEX DOCD: CPT | Performed by: INTERNAL MEDICINE

## 2025-04-14 RX ORDER — TESTOSTERONE 20.25 MG/1.25G
4 GEL TOPICAL DAILY
Qty: 150 G | Refills: 5 | Status: SHIPPED | OUTPATIENT
Start: 2025-04-14 | End: 2025-05-14

## 2025-04-14 ASSESSMENT — ENCOUNTER SYMPTOMS
SHORTNESS OF BREATH: 0
HEADACHES: 0
VOMITING: 0
FATIGUE: 0
FEVER: 0
PALPITATIONS: 0
UNEXPECTED WEIGHT CHANGE: 0
ABDOMINAL PAIN: 0
DIARRHEA: 0
NERVOUS/ANXIOUS: 0
TREMORS: 0
CONSTIPATION: 0
BACK PAIN: 1
NAUSEA: 0
NECK PAIN: 0
TROUBLE SWALLOWING: 0
WEAKNESS: 0

## 2025-04-14 NOTE — PATIENT INSTRUCTIONS
RECOMMENDATIONS  Increase testosterone gel to FOUR pumps per day  Follow up 6 months  Draw labs before next appointment

## 2025-04-14 NOTE — PROGRESS NOTES
History Of Present Illness  Boaz Ramirez is a 29 y.o. male with Klinefelter's syndrome.   Hypogonadism  Diagnosed at age 8.    Started testosterone replacement around age 12.      No gynecomastia    Androgel 1.62%, three pumps per day, applies to upper arm     No complaints.     Past Medical History  He has a past medical history of Allergy status to unspecified drugs, medicaments and biological substances (10/05/2015) and Allergy to other foods (10/05/2015).    Surgical History  He has no past surgical history on file.     Social History  He reports that he has never smoked. He has never used smokeless tobacco. He reports that he does not drink alcohol and does not use drugs.    Family History  Family History   Problem Relation Name Age of Onset    Arthritis Mother      Hypertension Mother      No Known Problems Father      Colon cancer Maternal Grandmother      Arthritis Maternal Grandmother      Hypothyroidism Maternal Grandmother      Hyperlipidemia Maternal Grandfather      Colon cancer Paternal Grandfather      Breast cancer Other aunt        Medications  Current Outpatient Medications   Medication Instructions    albuterol (ProAir HFA) 90 mcg/actuation inhaler 2 puffs, inhalation, Every 4 hours PRN    EPINEPHrine (EPIPEN) 0.3 mg, intramuscular, Once as needed, Inject into upper leg. Call 911 after use.    multivit-min/iron/folic acid/K (BARIATRIC MULTIVITAMINS ORAL) 1 capsule, Every 24 hours    testosterone (AndroGeL) 20.25 mg/1.25 gram (1.62 %) gel in metered-dose pump 2 Pump, transdermal, Daily       Allergies  Tree nut    Review of Systems   Constitutional:  Negative for fatigue, fever and unexpected weight change.   HENT:  Negative for trouble swallowing.    Eyes:  Negative for visual disturbance.   Respiratory:  Negative for shortness of breath.    Cardiovascular:  Negative for chest pain and palpitations.   Gastrointestinal:  Negative for abdominal pain, constipation, diarrhea, nausea and vomiting.  "  Endocrine: Negative for cold intolerance and heat intolerance.   Musculoskeletal:  Positive for back pain. Negative for neck pain.   Skin:  Negative for rash.   Neurological:  Negative for tremors, weakness and headaches.   Psychiatric/Behavioral:  The patient is not nervous/anxious.          Last Recorded Vitals  Height 1.778 m (5' 10\"), weight 110 kg (242 lb).    Physical Exam  Constitutional:       General: He is not in acute distress.  HENT:      Head: Normocephalic.   Chest:      Comments: No gynecomastia  Neurological:      Mental Status: He is alert.   Psychiatric:         Mood and Affect: Affect normal.          Relevant Results  Lab Results   Component Value Date    TSH 3.38 11/23/2020         IMPRESSION  KLINEFELTER'S SYNDROME  PRIMARY HYPOGONADISM  Testosterone atypically low on current replacement  No gynecomastia      RECOMMENDATIONS  Increase testosterone gel to FOUR pumps per day  Follow up 6 months  Draw labs before next appointment      "

## 2025-04-14 NOTE — LETTER
April 14, 2025     Austin Youssef MD  [address] [place]  [address]  [place] [address]    Patient: Boaz Ramirez   YOB: 1995   Date of Visit: 4/14/2025       Dear Dr. Austin Youssef MD:    Thank you for referring Boaz Ramirez to me for evaluation. Below are my notes for this consultation.  If you have questions, please do not hesitate to call me. I look forward to following your patient along with you.       Sincerely,     Piotr Mccullough MD      CC: No Recipients  ______________________________________________________________________________________    History Of Present Illness  Boaz Ramirez is a 29 y.o. male with Klinefelter's syndrome.   Hypogonadism  Diagnosed at age 8.    Started testosterone replacement around age 12.      No gynecomastia    Androgel 1.62%, three pumps per day, applies to upper arm     No complaints.     Past Medical History  He has a past medical history of Allergy status to unspecified drugs, medicaments and biological substances (10/05/2015) and Allergy to other foods (10/05/2015).    Surgical History  He has no past surgical history on file.     Social History  He reports that he has never smoked. He has never used smokeless tobacco. He reports that he does not drink alcohol and does not use drugs.    Family History  Family History   Problem Relation Name Age of Onset   • Arthritis Mother     • Hypertension Mother     • No Known Problems Father     • Colon cancer Maternal Grandmother     • Arthritis Maternal Grandmother     • Hypothyroidism Maternal Grandmother     • Hyperlipidemia Maternal Grandfather     • Colon cancer Paternal Grandfather     • Breast cancer Other aunt        Medications  Current Outpatient Medications   Medication Instructions   • albuterol (ProAir HFA) 90 mcg/actuation inhaler 2 puffs, inhalation, Every 4 hours PRN   • EPINEPHrine (EPIPEN) 0.3 mg, intramuscular, Once as needed, Inject into upper leg. Call 911 after use.   •  "multivit-min/iron/folic acid/K (BARIATRIC MULTIVITAMINS ORAL) 1 capsule, Every 24 hours   • testosterone (AndroGeL) 20.25 mg/1.25 gram (1.62 %) gel in metered-dose pump 2 Pump, transdermal, Daily       Allergies  Tree nut    Review of Systems   Constitutional:  Negative for fatigue, fever and unexpected weight change.   HENT:  Negative for trouble swallowing.    Eyes:  Negative for visual disturbance.   Respiratory:  Negative for shortness of breath.    Cardiovascular:  Negative for chest pain and palpitations.   Gastrointestinal:  Negative for abdominal pain, constipation, diarrhea, nausea and vomiting.   Endocrine: Negative for cold intolerance and heat intolerance.   Musculoskeletal:  Positive for back pain. Negative for neck pain.   Skin:  Negative for rash.   Neurological:  Negative for tremors, weakness and headaches.   Psychiatric/Behavioral:  The patient is not nervous/anxious.          Last Recorded Vitals  Height 1.778 m (5' 10\"), weight 110 kg (242 lb).    Physical Exam  Constitutional:       General: He is not in acute distress.  HENT:      Head: Normocephalic.   Chest:      Comments: No gynecomastia  Neurological:      Mental Status: He is alert.   Psychiatric:         Mood and Affect: Affect normal.          Relevant Results  Lab Results   Component Value Date    TSH 3.38 11/23/2020         IMPRESSION  KLINEFELTER'S SYNDROME  PRIMARY HYPOGONADISM  Testosterone atypically low on current replacement  No gynecomastia      RECOMMENDATIONS  Increase testosterone gel to FOUR pumps per day  Follow up 6 months  Draw labs before next appointment           "

## 2025-04-21 ASSESSMENT — ENCOUNTER SYMPTOMS
SHORTNESS OF BREATH: 0
ABDOMINAL PAIN: 0
FEVER: 0

## 2025-04-21 NOTE — PROGRESS NOTES
[facility]: [facility]  PHYSICAL EXAM      Boaz Ramirez is a 29 y.o. male that is presenting today for Annual Exam.    Assessment/Plan   Diagnoses and all orders for this visit:  Annual physical exam  -     CBC and Auto Differential; Future  -     Comprehensive Metabolic Panel; Future  -     Lipid Panel; Future  -     Hemoglobin A1C; Future  -     Vitamin D 25-Hydroxy,Total (for eval of Vitamin D levels); Future  -     TSH with reflex to Free T4 if abnormal; Future  Klinefelter syndrome, unspecified (HHS-HCC)  Hypogonadism in male  -     Testosterone; Future  Vitamin D deficiency  -     Vitamin D 25-Hydroxy,Total (for eval of Vitamin D levels); Future  Subacute maxillary sinusitis  -     methylPREDNISolone (Medrol Dospak) 4 mg tablets; Take as directed on package.  -     amoxicillin-clavulanate (Augmentin) 400-57 mg/5 mL suspension; Take 10.9 mL (875 mg) by mouth every 12 hours for 7 days.  Other fatigue  -     CBC and Auto Differential; Future  Other orders  -     Follow Up In Primary Care - Health Maintenance  -     Follow Up In Primary Care - Health Maintenance; Future  -     Ear Cerumen Removal    The left facial swelling, tenderness headache and very occasional dizziness may be on account of underlying sinusitis.  Will treat with Augmentin and steroid and they will let us know after about a week if the symptoms have improved.    The back pain is very intermittent.  We can see if it improves on the steroid pack.  May need to refer to physical therapy.    Obesity.  Talked about diet lifestyle efforts.    Reviewed the screening and prevention schedule.    Bilateral cerumen cleared.    Subjective   HPI  The patient is here for physical exam and follow up.  We reviewed the medical, family and social history as outlined below.  We reviewed any currently prescribed medications.  We reviewed the screening and prevention schedule in detail.    He has had some left facial swelling and  pressure, occasional headache at the end of the day.  This has been going on for the last couple weeks.  Also some sinus and allergy symptoms.    Has been gaining weight, not as active.  Also has some back pain intermittent especially after working lifting objects.    Review of Systems   Constitutional:  Negative for fever.   Respiratory:  Negative for shortness of breath.    Cardiovascular:  Negative for chest pain.   Gastrointestinal:  Negative for abdominal pain.   All other systems reviewed and are negative.     Objective   Vitals:    04/24/25 1552   BP: 128/78   Pulse: 77   SpO2: 97%     Body mass index is 34.15 kg/m².  Physical Exam  Vitals reviewed.   Constitutional:       General: He is not in acute distress.     Appearance: Normal appearance. He is not toxic-appearing.   HENT:      Head: Normocephalic and atraumatic.      Mouth/Throat:      Mouth: Mucous membranes are moist.   Eyes:      Pupils: Pupils are equal, round, and reactive to light.   Cardiovascular:      Rate and Rhythm: Normal rate and regular rhythm.      Heart sounds: No murmur heard.  Pulmonary:      Breath sounds: Normal breath sounds. No wheezing, rhonchi or rales.   Abdominal:      General: There is no distension.      Palpations: Abdomen is soft.   Musculoskeletal:      Right lower leg: No edema.      Left lower leg: No edema.   Neurological:      General: No focal deficit present.      Mental Status: He is alert and oriented to person, place, and time.     There are some mild edema that is apparent in the left maxillary and frontal area.  Otherwise the face is symmetric.    Bilateral cerumen impaction.    Diagnostic Results   Lab Results   Component Value Date    GLUCOSE 107 (H) 11/24/2023    CALCIUM 9.8 11/24/2023     11/24/2023    K 3.9 11/24/2023    CO2 22 (L) 11/24/2023     11/24/2023    BUN 11 11/24/2023    CREATININE 0.90 11/24/2023     Lab Results   Component Value Date    ALT 30 11/24/2023    AST 27 11/24/2023     "ALKPHOS 86 11/24/2023    BILITOT 0.6 11/24/2023     Lab Results   Component Value Date    WBC 10.1 10/01/2024    HGB 15.4 10/01/2024    HCT 45.8 10/01/2024    MCV 87 10/01/2024     10/01/2024     Lab Results   Component Value Date    CHOL 181 06/03/2023    CHOL 214 (H) 05/14/2022    CHOL 197 11/23/2020     Lab Results   Component Value Date    HDL 28.1 (A) 06/03/2023    HDL 29.2 (A) 05/14/2022    HDL 31 (L) 11/23/2020     Lab Results   Component Value Date    LDLCALC 119 11/23/2020     Lab Results   Component Value Date    TRIG 279 (H) 06/03/2023    TRIG 400 (H) 05/14/2022    TRIG 237 (H) 11/23/2020     No components found for: \"CHOLHDL\"  Lab Results   Component Value Date    HGBA1C 5.3 11/23/2020     Other labs not included in the list above were reviewed either before or during this encounter.    History   Medical History[1]  Surgical History[2]  Family History[3]  Social History     Socioeconomic History    Marital status: Single     Spouse name: Not on file    Number of children: Not on file    Years of education: Not on file    Highest education level: Not on file   Occupational History    Not on file   Tobacco Use    Smoking status: Never    Smokeless tobacco: Never   Vaping Use    Vaping status: Never Used   Substance and Sexual Activity    Alcohol use: Never    Drug use: Never    Sexual activity: Not on file   Other Topics Concern    Not on file   Social History Narrative    Not on file     Social Drivers of Health     Financial Resource Strain: Not on file   Food Insecurity: Not on file   Transportation Needs: Not on file   Physical Activity: Not on file   Stress: Not on file   Social Connections: Not on file   Intimate Partner Violence: Not on file   Housing Stability: Not on file     Allergies[4]  Medications Ordered Prior to Encounter[5]  Immunization History   Administered Date(s) Administered    DTP 1995, 1995, 01/08/1995, 01/16/1996, 07/21/1999    DTaP vaccine, pediatric  (INFANRIX) " 01/16/1996, 07/21/1999    Flu vaccine (IIV4), preservative free *Check age/dose* 10/17/2017, 10/11/2018, 10/26/2023    Flu vaccine, quadrivalent, no egg protein, age 6 month or greater (FLUCELVAX) 10/05/2019, 10/06/2022    Flu vaccine, quadrivalent, recombinant, preservative free, adult (FLUBLOK) 10/07/2021    Flu vaccine, trivalent, preservative free, no egg protein, age 6 months or greater (Flucelvax) 10/15/2024    Hep A, Unspecified 03/14/2012, 10/27/2012    Hepatitis A vaccine, pediatric/adolescent (HAVRIX, VAQTA) 03/14/2012, 10/27/2012    Hepatitis B vaccine, 19 yrs and under (RECOMBIVAX, ENGERIX) 1995, 1995, 04/15/1998    Hepatitis B vaccine, adult *Check Product/Dose* 1995, 1995, 04/15/1998    Influenza Whole 10/07/1996, 11/01/1996, 10/19/1999, 10/25/2000, 11/02/2001, 10/18/2002, 10/30/2003, 10/22/2007, 09/14/2009    Influenza, Unspecified 09/14/2009, 11/10/2010, 10/27/2012    Influenza, injectable, quadrivalent 10/29/2014, 10/14/2015, 10/13/2020    Influenza, seasonal, injectable 11/10/2010, 10/27/2012, 09/11/2013, 10/17/2016    MMR vaccine, subcutaneous (MMR II) 10/23/1995, 09/02/1998    Meningococcal ACWY, unspecified 08/23/2007, 08/05/2008    Meningococcal ACWY-D (Menactra) 4-valent conjugate vaccine 08/23/2007    Moderna COVID-19 vaccine, 12 years and older (50mcg/0.5mL)(Spikevax) 10/26/2023    Moderna SARS-CoV-2 Vaccination 02/05/2021, 03/05/2021, 11/03/2021    Novel influenza-H1N1-09, preservative-free 11/22/2009    PPD Test 08/31/2015    Polio, Unspecified 1995, 1995, 01/16/1996, 07/21/1999    Poliovirus vaccine, subcutaneous (IPOL) 1995, 1995, 01/16/1996, 07/21/1999    Td vaccine, age 7 years and older (TDVAX) 08/23/2007    Td vaccine, age 7 years and older (TENIVAC) 08/23/2007    Tdap vaccine, age 7 year and older (BOOSTRIX, ADACEL) 08/05/2008, 10/13/2020    Varicella vaccine, subcutaneous (VARIVAX) 07/18/1995, 08/05/2008     Patient's medical history  was reviewed and updated either before or during this encounter.       Austin Youssef MD         [1]   Past Medical History:  Diagnosis Date    Allergy status to unspecified drugs, medicaments and biological substances 10/05/2015    History of seasonal allergies    Allergy to other foods 10/05/2015    History of food allergy   [2] History reviewed. No pertinent surgical history.  [3]   Family History  Problem Relation Name Age of Onset    Arthritis Mother      Hypertension Mother      No Known Problems Father      Colon cancer Maternal Grandmother      Arthritis Maternal Grandmother      Hypothyroidism Maternal Grandmother      Hyperlipidemia Maternal Grandfather      Colon cancer Paternal Grandfather      Breast cancer Other aunt    [4]   Allergies  Allergen Reactions    Tree Nut Swelling   [5]   Current Outpatient Medications on File Prior to Visit   Medication Sig Dispense Refill    albuterol (ProAir HFA) 90 mcg/actuation inhaler Inhale 2 puffs every 4 hours if needed for wheezing or shortness of breath. 8.5 g 3    EPINEPHrine (Epipen) 0.3 mg/0.3 mL injection syringe Inject 0.3 mL (0.3 mg) into the muscle 1 time if needed for anaphylaxis for up to 1 dose. Inject into upper leg. Call 911 after use. 1 each 0    multivit-min/iron/folic acid/K (BARIATRIC MULTIVITAMINS ORAL) 1 capsule once every 24 hours.      testosterone (AndroGeL) 20.25 mg/1.25 gram (1.62 %) gel in metered-dose pump Place 4 Pump on the skin once daily. 150 g 5     No current facility-administered medications on file prior to visit.

## 2025-04-23 ENCOUNTER — APPOINTMENT (OUTPATIENT)
Dept: ALLERGY | Facility: CLINIC | Age: 30
End: 2025-04-23
Payer: COMMERCIAL

## 2025-04-24 ENCOUNTER — OFFICE VISIT (OUTPATIENT)
Dept: PRIMARY CARE | Facility: CLINIC | Age: 30
End: 2025-04-24
Payer: COMMERCIAL

## 2025-04-24 VITALS
DIASTOLIC BLOOD PRESSURE: 78 MMHG | HEART RATE: 77 BPM | OXYGEN SATURATION: 97 % | WEIGHT: 238 LBS | BODY MASS INDEX: 34.07 KG/M2 | HEIGHT: 70 IN | SYSTOLIC BLOOD PRESSURE: 128 MMHG

## 2025-04-24 DIAGNOSIS — E29.1 HYPOGONADISM IN MALE: ICD-10-CM

## 2025-04-24 DIAGNOSIS — J01.00 SUBACUTE MAXILLARY SINUSITIS: ICD-10-CM

## 2025-04-24 DIAGNOSIS — E55.9 VITAMIN D DEFICIENCY: ICD-10-CM

## 2025-04-24 DIAGNOSIS — R53.83 OTHER FATIGUE: ICD-10-CM

## 2025-04-24 DIAGNOSIS — H61.23 BILATERAL IMPACTED CERUMEN: ICD-10-CM

## 2025-04-24 DIAGNOSIS — Z00.00 ANNUAL PHYSICAL EXAM: Primary | ICD-10-CM

## 2025-04-24 DIAGNOSIS — Q98.4 KLINEFELTER SYNDROME, UNSPECIFIED (HHS-HCC): ICD-10-CM

## 2025-04-24 PROCEDURE — 99385 PREV VISIT NEW AGE 18-39: CPT | Performed by: INTERNAL MEDICINE

## 2025-04-24 PROCEDURE — 69209 REMOVE IMPACTED EAR WAX UNI: CPT | Mod: 50 | Performed by: INTERNAL MEDICINE

## 2025-04-24 RX ORDER — METHYLPREDNISOLONE 4 MG/1
TABLET ORAL
Qty: 21 TABLET | Refills: 0 | Status: SHIPPED | OUTPATIENT
Start: 2025-04-24 | End: 2025-04-30

## 2025-04-24 RX ORDER — AMOXICILLIN AND CLAVULANATE POTASSIUM 400; 57 MG/5ML; MG/5ML
875 POWDER, FOR SUSPENSION ORAL EVERY 12 HOURS SCHEDULED
Qty: 152.6 ML | Refills: 0 | Status: SHIPPED | OUTPATIENT
Start: 2025-04-24 | End: 2025-05-01

## 2025-04-24 ASSESSMENT — PAIN SCALES - GENERAL: PAINLEVEL_OUTOF10: 0-NO PAIN

## 2025-04-24 NOTE — PROGRESS NOTES
Patient ID: Boaz Ramirez is a 29 y.o. male.    Ear Cerumen Removal    Date/Time: 4/24/2025 4:54 PM    Performed by: hTerese Gilbert LPN  Authorized by: Austin Youssef MD    Consent:     Consent obtained:  Verbal    Consent given by:  Patient    Risks, benefits, and alternatives were discussed: yes    Universal protocol:     Procedure explained and questions answered to patient or proxy's satisfaction: yes      Relevant documents present and verified: no      Test results available: no      Imaging studies available: no      Required blood products, implants, devices, and special equipment available: no      Site/side marked: no      Immediately prior to procedure, a time out was called: no      Patient identity confirmed:  Verbally with patient  Procedure details:     Location:  L ear and R ear    Procedure type: irrigation      Procedure outcomes: cerumen removed    Post-procedure details:     Inspection:  Some cerumen remaining    Hearing quality:  Improved    Procedure completion:  Tolerated

## 2025-05-09 LAB
ERYTHROCYTE [DISTWIDTH] IN BLOOD BY AUTOMATED COUNT: 12.6 % (ref 11–15)
HCT VFR BLD AUTO: 46.6 % (ref 38.5–50)
HGB BLD-MCNC: 15.9 G/DL (ref 13.2–17.1)
MCH RBC QN AUTO: 30.2 PG (ref 27–33)
MCHC RBC AUTO-ENTMCNC: 34.1 G/DL (ref 32–36)
MCV RBC AUTO: 88.6 FL (ref 80–100)
PLATELET # BLD AUTO: 275 THOUSAND/UL (ref 140–400)
PMV BLD REES-ECKER: 10.6 FL (ref 7.5–12.5)
RBC # BLD AUTO: 5.26 MILLION/UL (ref 4.2–5.8)
TESTOST SERPL-MCNC: 82 NG/DL (ref 250–827)
WBC # BLD AUTO: 10.2 THOUSAND/UL (ref 3.8–10.8)

## 2025-05-12 DIAGNOSIS — R51.9 NONINTRACTABLE HEADACHE, UNSPECIFIED CHRONICITY PATTERN, UNSPECIFIED HEADACHE TYPE: Primary | ICD-10-CM

## 2025-05-22 ENCOUNTER — HOSPITAL ENCOUNTER (OUTPATIENT)
Dept: RADIOLOGY | Facility: CLINIC | Age: 30
End: 2025-05-22
Payer: COMMERCIAL

## 2025-05-22 ENCOUNTER — HOSPITAL ENCOUNTER (OUTPATIENT)
Dept: RADIOLOGY | Facility: CLINIC | Age: 30
Discharge: HOME | End: 2025-05-22
Payer: COMMERCIAL

## 2025-05-22 DIAGNOSIS — R51.9 NONINTRACTABLE HEADACHE, UNSPECIFIED CHRONICITY PATTERN, UNSPECIFIED HEADACHE TYPE: ICD-10-CM

## 2025-05-22 PROCEDURE — 70486 CT MAXILLOFACIAL W/O DYE: CPT | Performed by: RADIOLOGY

## 2025-05-22 PROCEDURE — 70450 CT HEAD/BRAIN W/O DYE: CPT

## 2025-05-22 PROCEDURE — 70486 CT MAXILLOFACIAL W/O DYE: CPT

## 2025-05-22 PROCEDURE — 70450 CT HEAD/BRAIN W/O DYE: CPT | Performed by: RADIOLOGY

## 2025-06-11 DIAGNOSIS — R51.9 NONINTRACTABLE HEADACHE, UNSPECIFIED CHRONICITY PATTERN, UNSPECIFIED HEADACHE TYPE: Primary | ICD-10-CM

## 2025-06-20 ENCOUNTER — TELEPHONE (OUTPATIENT)
Dept: ALLERGY | Facility: HOSPITAL | Age: 30
End: 2025-06-20

## 2025-06-25 ENCOUNTER — APPOINTMENT (OUTPATIENT)
Dept: ALLERGY | Facility: CLINIC | Age: 30
End: 2025-06-25
Payer: COMMERCIAL

## 2025-06-25 VITALS
DIASTOLIC BLOOD PRESSURE: 82 MMHG | HEART RATE: 84 BPM | SYSTOLIC BLOOD PRESSURE: 138 MMHG | RESPIRATION RATE: 20 BRPM | WEIGHT: 232.14 LBS | TEMPERATURE: 97.5 F | HEIGHT: 70 IN | BODY MASS INDEX: 33.23 KG/M2

## 2025-06-25 DIAGNOSIS — Z91.018 TREE NUT ALLERGY: Primary | ICD-10-CM

## 2025-06-25 DIAGNOSIS — J30.81 ALLERGIC RHINITIS DUE TO ANIMAL HAIR AND DANDER: ICD-10-CM

## 2025-06-25 DIAGNOSIS — J30.1 SEASONAL ALLERGIC RHINITIS DUE TO POLLEN: ICD-10-CM

## 2025-06-25 DIAGNOSIS — J30.89 ALLERGIC RHINITIS DUE TO DUST MITE: ICD-10-CM

## 2025-06-25 DIAGNOSIS — J30.2 SEASONAL ALLERGIC RHINITIS DUE TO FUNGAL SPORES: ICD-10-CM

## 2025-06-25 PROCEDURE — 95004 PERQ TESTS W/ALRGNC XTRCS: CPT | Performed by: ALLERGY & IMMUNOLOGY

## 2025-06-25 PROCEDURE — 99204 OFFICE O/P NEW MOD 45 MIN: CPT | Performed by: ALLERGY & IMMUNOLOGY

## 2025-06-25 RX ORDER — LEVOCETIRIZINE DIHYDROCHLORIDE 5 MG/1
5 TABLET, FILM COATED ORAL DAILY
Qty: 30 TABLET | Refills: 11 | Status: SHIPPED | OUTPATIENT
Start: 2025-06-25 | End: 2026-06-25

## 2025-06-25 RX ORDER — FLUTICASONE PROPIONATE 50 MCG
2 SPRAY, SUSPENSION (ML) NASAL DAILY
Qty: 16 G | Refills: 11 | Status: SHIPPED | OUTPATIENT
Start: 2025-06-25 | End: 2026-06-25

## 2025-06-25 RX ORDER — EPINEPHRINE 0.3 MG/.3ML
INJECTION INTRAMUSCULAR
Qty: 2 EACH | Refills: 1 | Status: SHIPPED | OUTPATIENT
Start: 2025-06-25

## 2025-06-25 ASSESSMENT — ENCOUNTER SYMPTOMS
CONSTITUTIONAL NEGATIVE: 1
EYES NEGATIVE: 1
HEMATOLOGIC/LYMPHATIC NEGATIVE: 1
CARDIOVASCULAR NEGATIVE: 1
ALLERGIC/IMMUNOLOGIC NEGATIVE: 1
MUSCULOSKELETAL NEGATIVE: 1
GASTROINTESTINAL NEGATIVE: 1
RESPIRATORY NEGATIVE: 1

## 2025-06-25 NOTE — PROGRESS NOTES
Boaz Ramirez presents for initial evaluation today.      Boaz Ramirez was seen at the request of Austin Youssef MD for a chief complaint of concern for allergies; a report with my findings is being sent via written or electronic means to Austin Youssef MD with my recommendations for treatment    He presents with his mother who helps provide the following history:    He ate a cookie with macadamia nut in October 2024.  He developed facial flushing, eye swelling, throat tightening.  He did not have an associated rash or vomiting.  He took Benadryl which helped.  He is not sure if he has ever had macadamia nut before.  He has not eaten other tree nuts.  He tolerates peanut.  Mom notes that he has multiple food allergies as a baby including egg, wheat, dairy which he grew out of.    Rhinitis: Reports mild nasal congestion.  He previously followed with Dr. Mcclure and was on allergy shots while in middle school.  He uses Xyzal as needed.    Asthma: Reports history of asthma and takes Albuterol late fall/early winter, generally a few times during the season.     Eczema: Reports eczema as a young child, which he outgrew    Food allergy: as above    Venom allergy:  Denies     Drug allergy:  Denies    Environmental History:  Type of home:  House  Pets in the house: Dog (2)  Mold or moisture in the home: None  Bedroom cj: Carpet  Dust mite covers on bed:  Not on mattress, yes on pillows   Cigarette exposure in the home:  No  Occupation/School: Works in restaurant, works in dish room     Pertinent Allergy/Immunology family history:  Mom with allergic rhinitis     Review of Systems   Constitutional: Negative.    HENT: Negative.     Eyes: Negative.    Respiratory: Negative.     Cardiovascular: Negative.    Gastrointestinal: Negative.    Musculoskeletal: Negative.    Skin: Negative.    Allergic/Immunologic: Negative.    Hematological: Negative.      Vital signs:  /82   Pulse 84   Temp 36.4 °C (97.5  "°F) (Temporal)   Resp 20   Ht 1.774 m (5' 9.84\")   Wt 105 kg (232 lb 2.3 oz)   BMI 33.46 kg/m²     Physical Exam:  GENERAL: Alert, oriented and in no acute distress.     HEENT: EYES: No conjunctival injection or cobblestoning. Nose: nasal turbinates are not edematous and are not boggy.  There is no mucous stranding, polyps, or blood noted. EARS: Tympanic membranes are clear. MOUTH: moist and pink with no exudates, ulcers, or thrush. NECK: No upper airway stridor noted.       HEART: regular rate and rhythm.       LUNGS: Clear to auscultation bilaterally. No wheezing, rhonchi or rales.        ABDOMEN: Positive bowel sounds, soft, nontender, nondistended.       EXTREMITIES: No clubbing or edema.        NEURO:  Normal affect.  Gait normal.      SKIN: No rash, hives, or angioedema noted    Environmental Allergy Testing:  Test Results (wheal/flare in mm)    Controls  Controls  Histamine:   Negative: 0    Trees:  Trees  American Beech: 5/10  [place]rey: 0  Birch: 10/25  Black [place]: 0  [place]:   [place]: 0  [place]: 0  Elm: 0  [place]:   Maple: 0  [place]: 0  [place]:   [place]: 0  White poplar: 0     Grasses:  Grass  [place]: 0  [place]: 0  [place]uy: 0  KORT Grass Mix: 10/25  Sweet Vernal:     Weeds:  Weeds  Cocklebur: 0  Dandelion:   English Plantain: 0  Goldenrod: 0  [place]: 0  Mugwort:   Pigweed: 0  Ragweed Mix: 0  Russian Thistle: 0  Yellow Dock: 0     Molds:  Molds  Alternaria:   Aspergillus: -  Cladosporium: -  Helminthosporium: -  Penicillium: -  Stemphyllium: 5/10    Animals/Dust Mite:  Animals  Cat: 5/10  AP do/10  [place] Do  Mouse: 0  Cockroach:   Dust Mite F: 5/10  Dust Mite P:      Food Allergy Test Results  Tree Nuts  Tamms: 0  Brazil Nut: 0  Cashew: 10/30  Hazelnut: 5/15  Pecan: 0  Pistachio:   [place]ldahl: 4/10         Impression:  1. Tree nut allergy    2. Seasonal allergic rhinitis due to pollen    3. Allergic rhinitis due to animal hair and dander  "   4. Allergic rhinitis due to dust mite    5. Seasonal allergic rhinitis due to fungal spores      Assessment and Plan:  Tree nut allergy: History of clinical reaction to macadamia nut with positive testing to cashew, hazelnut, pistachio, walnut.  Macadamia skin prick testing unavailable.  Will check macadamia IgE as well as specific IgE and component IgE (as available) testing to walnut, pecan, cashew, pistachio, hazelnut, Brazil nut, almond.  EpiPen prescribed, food allergy action plan provided, EpiPen teaching performed.  We discussed Xolair (omalizumab) has been approved for one or more food allergies as a treatment to reduce allergic reactions.  They will let us know if they are interested in discussing further.  We will contact them with results of labs and make further recommendations pending results.    Allergic rhinitis, seasonal and perennial: He was found to have allergic sensitivity to tree, grass, weed, cat, dog, dust mite, cockroach and mold on aeroallergen skin prick testing today.   Recommend starting Flonase 2 sprays each nostril once daily and may use levocetirizine 5 mg once daily as needed.  We reviewed proper nasal spray administration technique.     Plan for follow-up in spring 2026 or sooner if needed

## 2025-06-25 NOTE — LETTER
June 25, 2025     Austin Youssef MD  [address]  [address]  [place] [address]    Patient: Boaz Ramirez   YOB: 1995   Date of Visit: 6/25/2025       Dear Dr. Austin Youssef MD:    Thank you for referring Boaz Ramirez to me for evaluation. Below are my notes for this consultation.  If you have questions, please do not hesitate to call me. I look forward to following your patient along with you.       Sincerely,     Princess RACHEL Aguirre MD      CC: No Recipients  ______________________________________________________________________________________    Boaz Ramirez presents for initial evaluation today.      Boaz Ramirez was seen at the request of Austin Youssef MD for a chief complaint of concern for allergies; a report with my findings is being sent via written or electronic means to Austin Youssef MD with my recommendations for treatment    He presents with his mother who helps provide the following history:    He ate a cookie with macadamia nut in October 2024.  He developed facial flushing, eye swelling, throat tightening.  He did not have an associated rash or vomiting.  He took Benadryl which helped.  He is not sure if he has ever had macadamia nut before.  He has not eaten other tree nuts.  He tolerates peanut.  Mom notes that he has multiple food allergies as a baby including egg, wheat, dairy which he grew out of.    Rhinitis: Reports mild nasal congestion.  He previously followed with Dr. Mcclure and was on allergy shots while in middle school.  He uses Xyzal as needed.    Asthma: Reports history of asthma and takes Albuterol late fall/early winter, generally a few times during the season.     Eczema: Reports eczema as a young child, which he outgrew    Food allergy: as above    Venom allergy:  Denies     Drug allergy:  Denies    Environmental History:  Type of home:  House  Pets in the house: Dog (2)  Mold or moisture in the home: None  Bedroom cj:  "Carpet  Dust mite covers on bed:  Not on mattress, yes on pillows   Cigarette exposure in the home:  No  Occupation/School: Works in restaurant, works in dish room     Pertinent Allergy/Immunology family history:  Mom with allergic rhinitis     Review of Systems   Constitutional: Negative.    HENT: Negative.     Eyes: Negative.    Respiratory: Negative.     Cardiovascular: Negative.    Gastrointestinal: Negative.    Musculoskeletal: Negative.    Skin: Negative.    Allergic/Immunologic: Negative.    Hematological: Negative.      Vital signs:  /82   Pulse 84   Temp 36.4 °C (97.5 °F) (Temporal)   Resp 20   Ht 1.774 m (5' 9.84\")   Wt 105 kg (232 lb 2.3 oz)   BMI 33.46 kg/m²     Physical Exam:  GENERAL: Alert, oriented and in no acute distress.     HEENT: EYES: No conjunctival injection or cobblestoning. Nose: nasal turbinates are not edematous and are not boggy.  There is no mucous stranding, polyps, or blood noted. EARS: Tympanic membranes are clear. MOUTH: moist and pink with no exudates, ulcers, or thrush. NECK: No upper airway stridor noted.       HEART: regular rate and rhythm.       LUNGS: Clear to auscultation bilaterally. No wheezing, rhonchi or rales.        ABDOMEN: Positive bowel sounds, soft, nontender, nondistended.       EXTREMITIES: No clubbing or edema.        NEURO:  Normal affect.  Gait normal.      SKIN: No rash, hives, or angioedema noted    Environmental Allergy Testing:  Test Results (wheal/flare in mm)    Controls  Controls  Histamine: 6/20  Negative: 0    Trees:  Trees  American Beech: 5/10  Aubrey: 0  Birch: 10/25  Black [place]: 0  [place]: 6/20  [place] Oak: 0  [place]: 0  Elm: 0  [place]: 5/20  Maple: 0  [place]: 0  [place]: 6/25  [place]: 0  White poplar: 0     Grasses:  Grass  Bahia: 0  [place]: 0  Chuy: 0  KORT Grass Mix: 10/25  Sweet Vernal: 5/25    Weeds:  Weeds  Cocklebur: 0  Dandelion: 4/7  English Plantain: 0  Goldenrod: 0  Lambs Quarters: 0  Mugwort: 4/7  Pigweed: " 0  Ragweed Mix: 0  Russian Thistle: 0  Yellow Dock: 0     Molds:  Molds  Alternaria:   Aspergillus: -  Cladosporium: -  Helminthosporium: -  Penicillium: -  Stemphyllium: 5/10    Animals/Dust Mite:  Animals  Cat: 5/10  AP do/10  Capellan Do  Mouse: 0  Cockroach:   Dust Mite F: 5/10  Dust Mite P:      Food Allergy Test Results  Tree Nuts  [place]: 0  Brazil Nut: 0  Cashew: 10/30  Hazelnut: 5/15  Pecan: 0  Pistachio:   [place]: 4/10         Impression:  1. Tree nut allergy    2. Seasonal allergic rhinitis due to pollen    3. Allergic rhinitis due to animal hair and dander    4. Allergic rhinitis due to dust mite    5. Seasonal allergic rhinitis due to fungal spores      Assessment and Plan:  Tree nut allergy: History of clinical reaction to macadamia nut with positive testing to cashew, hazelnut, pistachio, walnut.  Macadamia skin prick testing unavailable.  Will check macadamia IgE as well as specific IgE and component IgE (as available) testing to walnut, pecan, cashew, pistachio, hazelnut, Brazil nut, almond.  EpiPen prescribed, food allergy action plan provided, EpiPen teaching performed.  We discussed Xolair (omalizumab) has been approved for one or more food allergies as a treatment to reduce allergic reactions.  They will let us know if they are interested in discussing further.  We will contact them with results of labs and make further recommendations pending results.    Allergic rhinitis, seasonal and perennial: He was found to have allergic sensitivity to tree, grass, weed, cat, dog, dust mite, cockroach and mold on aeroallergen skin prick testing today.   Recommend starting Flonase 2 sprays each nostril once daily and may use levocetirizine 5 mg once daily as needed.  We reviewed proper nasal spray administration technique.     Plan for follow-up in spring 2026 or sooner if needed

## 2025-06-25 NOTE — PATIENT INSTRUCTIONS
It was nice to meet you today     Your environmental allergy testing today was positive to tree, grass, weed, cat, dog, dust mite, cockroach and mold.  Please see below for environmental allergen avoidance recommendations.     Your food allergy testing was positive to tree nuts    Please have labs drawn. Please note that some labs will come back sooner than others.  We will contact you when all labs have returned so that we can discuss results.     Strictly avoid tree nuts. Read ingredient labels. Notify friends, family, and restaurants of food allergy.  Carry auto-injectable epinephrine at all times of possible exposure    Xolair (omalizumab) has been approved for one or more food allergies as a treatment to reduce allergic reactions.   The website is www.xolair.[email or web address]. Please let us know if you are interested in discussing further.     Continue levocetirizine (Xyzal) 5 mg once daily as needed    Start Flonase 2 sprays each nostril once daily    Technique for nasal spray administration:  First, look slightly down, breathe normally, you do not need to sniff while you spray.  To use the nose spray, place the tip in your nose with the opposite hand (left hand, right side of nose, right hand, left side of nose), and aim or point toward the outside of the nose.  Do not sniff or snort medication afterwards as this can cause most of the medication to be swallowed.  Rather, dab your nose with a tissue if any runs out.    We would like to see you in follow up in spring 2026 or sooner if needed    Environmental Allergy Testing:  Test Results (wheal/flare in mm)    Controls  Controls  Histamine: 6/20  Negative: 0    Trees:  Trees  American Beech: 5/10  [place]ubrey: 0  Birch: 10/25  Black [place]: 0  [place]: 6/20  [place]: 0  [place]: 0  Elm: 0  [place]: 5/20  Maple: 0  [place]: 0  [place]: 6/25  [place]: 0  White poplar: 0     Grasses:  Grass  B[place]ia: 0  [place]: 0  Chuy: 0  KORT Grass Mix: 10/25  Sweet Vernal:      Weeds:  Weeds  Cocklebur: 0  Dandelion:   English Plantain: 0  Goldenrod: 0  Lambs Quarters: 0  Mugwort:   Pigweed: 0  Ragweed Mix: 0  Russian Thistle: 0  Yellow Dock: 0     Molds:  Molds  Alternaria:   Aspergillus: -  Cladosporium: -  Helminthosporium: -  Penicillium: -  Stemphyllium: 5/10    Animals/Dust Mite:  Animals  Cat: 5/10  AP do/10  Capellan Do  Mouse: 0  Cockroach:   Dust Mite F: 5/10  Dust Mite P:        Food Allergy Test Results  Tree Nuts  [place]: 0  Brazil Nut: 0  Cashew: 10/30  Hazelnut: 5/15  Pecan: 0  Pistachio:   [place]: 4/10       Thank you for your visit to the [facility].  Part of a successful visit is taking home the information you learned today and using it to make things better.  These take home instructions are to help you, if you have questions or concerns, please contact us.     Please see below for recommendations on environmental allergy control or modification:     Pollen Avoidance--If you are sensitive to pollen, there are a few tips to help limit, but not avoid, exposure.     Minimize outdoor activity between 5am-10am, pollen levels are highest at this time.       Keep car windows closed when traveling.     Close house windows at night, use the air conditioning if necessary.     Check the pollen count to know what pollen is outside (http://aaaai.org/nab).       , Pet Avoidance     Keep pets out of the bedroom at all times.     Keep pets off of furniture and other surfaces like counter tops.     More frequent bathing can help      Groom your pet outside so hair and dander stay outside the home when brushed.     Consider using HEPA air purifier in bedroom    , and Dust mite control.            1.  Dust mite proof covers/encasing on the mattress, pillow and box spring.            2.  Wash sheets and bed linens in hot water each week.          3.  Vacuum carpets in bedroom each week.           4.  Dust or wipe down any hard surfaces.          5.  Avoid using a humidifier in the bedroom      Limit Cigarette smoke exposure.  Smoking and second hand smoke can irritate the nose and sinuses.  You and the people around you will benefit from avoiding cigarette smoke.

## 2025-06-26 ENCOUNTER — APPOINTMENT (OUTPATIENT)
Dept: OPHTHALMOLOGY | Facility: CLINIC | Age: 30
End: 2025-06-26
Payer: COMMERCIAL

## 2025-06-26 DIAGNOSIS — H52.223 REGULAR ASTIGMATISM OF BOTH EYES: Primary | ICD-10-CM

## 2025-06-26 DIAGNOSIS — H02.79 MADAROSIS OF EYEBROW: ICD-10-CM

## 2025-06-26 DIAGNOSIS — H50.15 ALTERNATING EXOTROPIA: ICD-10-CM

## 2025-06-26 DIAGNOSIS — H02.729 MADAROSIS OF EYELID: ICD-10-CM

## 2025-06-26 PROBLEM — H52.03 HYPEROPIA OF BOTH EYES WITH ASTIGMATISM: Status: ACTIVE | Noted: 2025-06-26

## 2025-06-26 PROBLEM — H52.203 HYPEROPIA OF BOTH EYES WITH ASTIGMATISM: Status: ACTIVE | Noted: 2025-06-26

## 2025-06-26 PROCEDURE — 92015 DETERMINE REFRACTIVE STATE: CPT | Performed by: STUDENT IN AN ORGANIZED HEALTH CARE EDUCATION/TRAINING PROGRAM

## 2025-06-26 PROCEDURE — 92004 COMPRE OPH EXAM NEW PT 1/>: CPT | Performed by: STUDENT IN AN ORGANIZED HEALTH CARE EDUCATION/TRAINING PROGRAM

## 2025-06-26 ASSESSMENT — TONOMETRY
OS_IOP_MMHG: STP
OD_IOP_MMHG: STP
IOP_METHOD: PALPATION

## 2025-06-26 ASSESSMENT — ENCOUNTER SYMPTOMS
PSYCHIATRIC NEGATIVE: 0
GASTROINTESTINAL NEGATIVE: 0
CONSTITUTIONAL NEGATIVE: 0
ALLERGIC/IMMUNOLOGIC NEGATIVE: 0
EYES NEGATIVE: 1
RESPIRATORY NEGATIVE: 0
MUSCULOSKELETAL NEGATIVE: 0
NEUROLOGICAL NEGATIVE: 0
ENDOCRINE NEGATIVE: 0
HEMATOLOGIC/LYMPHATIC NEGATIVE: 0
CARDIOVASCULAR NEGATIVE: 0

## 2025-06-26 ASSESSMENT — REFRACTION_MANIFEST
OD_AXIS: 009
OD_CYLINDER: -2.25
OD_CYLINDER: -2.00
OS_AXIS: 158
OS_SPHERE: PLANO
OS_AXIS: 154
OS_CYLINDER: -1.00
OD_SPHERE: +0.25
OD_AXIS: 015
OS_SPHERE: +0.50
OD_SPHERE: -0.50
METHOD_AUTOREFRACTION: 1
OS_CYLINDER: -1.50

## 2025-06-26 ASSESSMENT — CONF VISUAL FIELD
METHOD: COUNTING FINGERS
OS_SUPERIOR_NASAL_RESTRICTION: 0
OD_SUPERIOR_TEMPORAL_RESTRICTION: 0
OD_INFERIOR_TEMPORAL_RESTRICTION: 0
OS_INFERIOR_NASAL_RESTRICTION: 0
OS_INFERIOR_TEMPORAL_RESTRICTION: 0
OD_SUPERIOR_NASAL_RESTRICTION: 0
OD_INFERIOR_NASAL_RESTRICTION: 0
OS_SUPERIOR_TEMPORAL_RESTRICTION: 0
OS_NORMAL: 1
OD_NORMAL: 1

## 2025-06-26 ASSESSMENT — VISUAL ACUITY
OD_SC: 20/20-2
METHOD: SNELLEN - LINEAR
OS_SC: 20/20

## 2025-06-26 ASSESSMENT — EXTERNAL EXAM - RIGHT EYE: OD_EXAM: NORMAL

## 2025-06-26 ASSESSMENT — CUP TO DISC RATIO
OS_RATIO: 0.45
OD_RATIO: 0.40

## 2025-06-26 ASSESSMENT — SLIT LAMP EXAM - LIDS
COMMENTS: MADAROSIS
COMMENTS: MADAROSIS

## 2025-06-26 ASSESSMENT — EXTERNAL EXAM - LEFT EYE: OS_EXAM: NORMAL

## 2025-06-26 NOTE — PROGRESS NOTES
Assessment/Plan   Diagnoses and all orders for this visit:  Hyperopia of both eyes with astigmatism  - New patient, unknown previous refractive error, issued spec rx to wear, Ocular structures otherwise normal. RTC 1yr   - Patient reports headaches; today no ocular etiology noted-continue mgmt with PCP or neuro   - unable to obtain accurate pressure today with pt cooperation; soft and equal on palpation   Alternating exotropia   - Left eye fixation preference  - no subjective symptoms of diplopia  - monitor at this time with patient being asymptomatic   Madarosis of eyebrow  Madarosis of eyelid  - Pmx of alopecia  - Monitor at comprehensive eye exam in 1 year    RTC 1 year for annual with DFE and MRX

## 2025-06-27 ENCOUNTER — TELEPHONE (OUTPATIENT)
Dept: ALLERGY | Facility: CLINIC | Age: 30
End: 2025-06-27
Payer: COMMERCIAL

## 2025-06-27 PROBLEM — H50.15 ALTERNATING EXOTROPIA: Status: ACTIVE | Noted: 2025-06-27

## 2025-06-27 LAB
ALMOND IGE QN: 0.51 KU/L
ALMOND IGE RAST: 1
BRAZIL NUT (RBER E) 1 IGE QN: 0.21 KU/L
BRAZIL NUT IGE QN: 0.13 KU/L
BRAZIL NUT IGE RAST: ABNORMAL
CASHEW NUT (RANA O) 3 IGE QN: 1.15 KU/L
CASHEW NUT IGE QN: 2.86 KU/L
CASHEW NUT IGE RAST: 2
ENGLISH WALNUT (RJUG R) 1 IGE QN: 1.91 KU/L
ENGLISH WALNUT (RJUG R) 3 IGE QN: 3.9 KU/L
HAZELNUT (NCOR A) 9 IGE QN: 0.14 KU/L
HAZELNUT (RCOR A) 1 IGE QN: 1.7 KU/L
HAZELNUT (RCOR A) 14 IGE QN: <0.1 KU/L
HAZELNUT (RCOR A) 8 IGE QN: 3.36 KU/L
HAZELNUT IGE QN: 1.68 KU/L
HAZELNUT IGE RAST: 2
IGE SERPL-ACNC: 968 KU/L
MACADAMIA IGE QN: 0.61 KU/L
MACADAMIA IGE RAST: 1
PECAN/HICK NUT IGE QN: 0.45 KU/L
PECAN/HICK NUT IGE RAST: 1
PISTACHIO IGE QN: 3.68 KU/L
PISTACHIO IGE RAST: 3
REF LAB TEST REF RANGE: NORMAL
WALNUT IGE QN: 3.43 KU/L
WALNUT IGE RAST: 2

## 2025-06-27 NOTE — TELEPHONE ENCOUNTER
Please let mom know that tree nut allergy testing is positive to all tree nuts including macadamia nut, recommend strict avoidance.  The labs and skin testing suggest that he may be able to tolerate almond.  If he is interested in almond challenge please let us know.  For now continue to avoid all tree nuts.

## 2025-06-27 NOTE — TELEPHONE ENCOUNTER
Result Communication    Resulted Orders   Hazelnut IgE   Result Value Ref Range    HAZELNUT (F17) IGE 1.68 (H) kU/L    CLASS 2     Narrative    FASTING:UNKNOWN    FASTING: UNKNOWN   Hazelnut Component Panel   Result Value Ref Range    Cor a1 (f428) 1.70 (H) <0.10 kU/L    Cor a8 (f425) 3.36 (H) <0.10 kU/L    Cor a9 (f440) 0.14 (H) <0.10 kU/L    Cor a14 (f439) <0.10 <0.10 kU/L      Comment:      Positive hazelnut component IgE results may be   clinically significant even if quantification levels   (kU/L) are low. IgE reactivity to hazelnut components   Cor a 9 and Cor a 14 are most associated with allergic  reactions, which may be systemic and severe. IgE  reactivity to hazelnut component Cor a 1 alone has been  associated primarily with mild local reactions and may  be seen in the clinical setting of tree pollen allergy  and oral allergy syndrome. IgE reactivity to hazelnut  component Cor a 8 has been associated with both systemic  allergic reactions and milder oropharyngeal reactions,  as well as IgE reactivity to other lipid transfer   proteins found in stone fruits. Patients with a history  of true hazelnut allergy are often allergic to other  tree nuts. Additional information can be found at   [email or web address]         Narrative    FASTING:UNKNOWN    FASTING: UNKNOWN   Brazil Nut IgE   Result Value Ref Range    BRAZIL NUT (F18) IGE 0.13 (H) kU/L    CLASS 0/1     Narrative    FASTING:UNKNOWN    FASTING: UNKNOWN   Brazil Nut Component Panel   Result Value Ref Range    Sacha e1 (f354) 0.21 (H) <0.10 kU/L      Comment:      Positive [place] nut component IgE results may be   clinically significant even if quantification levels   (kU/L) are low. IgE reactivity to Brazil nut component   Sacha e 1 is associated with systemic allergic reactions,   which can be severe. Patients with a history of true   Brazil nut allergy are often allergic to other  tree nuts. Additional information can be found at    [email or web address]         Narrative    FASTING:UNKNOWN    FASTING: UNKNOWN   Macadamia nut IgE   Result Value Ref Range    MACADAMIA NUT () IGE 0.61 (H) kU/L    CLASS 1     Narrative    FASTING:UNKNOWN    FASTING: UNKNOWN   [place] IgE   Result Value Ref Range    [place] (F20) IGE 0.51 (H) kU/L    CLASS 1     Narrative    FASTING:UNKNOWN    FASTING: UNKNOWN   [place] IgE   Result Value Ref Range    WALNUT (F256) IGE 3.43 (H) kU/L    CLASS 2     Narrative    FASTING:UNKNOWN    FASTING: UNKNOWN   [place] Component Panel   Result Value Ref Range    rJug r1 (f441) 1.91 (H) <0.10 kU/L    rJug r3 (f442) 3.90 (H) <0.10 kU/L      Comment:      Positive walnut component IgE results may be   clinically significant even if quantification levels   (kU/L) are low. IgE reactivity to walnut component   Jug r 1 is most associated with systemic allergic  reactions, which can be severe. IgE reactivity to  walnut component Jug r 3 has been associated with both  systemic allergic reactions, as well as milder local  reactions. IgE reactivity to Jug r 3 has also been  associated with IgE reactivity to other lipid transfer  proteins found in pollens, stone fruits and other nuts.    Patients with a history of true walnut allergy are  often allergic to other tree nuts, and in particular,   pecan. Additional information can be found at   [email or web address]         Narrative    FASTING:UNKNOWN    FASTING: UNKNOWN   Pecan, Nut IgE   Result Value Ref Range    PECAN NUT (F201) IGE 0.45 (H) kU/L    CLASS 1     Narrative    FASTING:UNKNOWN    FASTING: UNKNOWN   Cashew IgE   Result Value Ref Range    CASHEW NUT (F202) IGE 2.86 (H) kU/L    CLASS 2     Narrative    FASTING:UNKNOWN    FASTING: UNKNOWN   Cashew Nut Component RAna o 3   Result Value Ref Range    Leonie o3 (f443) 1.15 (H) <0.10 kU/L      Comment:      Positive cashew component IgE results may be   clinically significant even if quantification levels   (kU/L) are low. IgE  "reactivity to cashew component   Leonie o 3 is associated with systemic allergic  reactions, which can be severe. Patients with a history  of true cashew allergy are often allergic to other  tree nuts, and in particular, pistachio. Additional  information can be found at [email or web address].         Narrative    FASTING:UNKNOWN    FASTING: UNKNOWN   Pistachio IgE   Result Value Ref Range    PISTACHIO (F203) IGE 3.68 (H) kU/L    CLASS 3     Narrative    FASTING:UNKNOWN    FASTING: UNKNOWN   IgE   Result Value Ref Range    IMMUNOGLOBULIN E 968 (H) <FJ=334 kU/L    Narrative    FASTING:UNKNOWN    FASTING: UNKNOWN   Allergen Interpretation   Result Value Ref Range    Allergen Interpretation        Comment:         Specific                        Level of Allergen  IGE Class      kU/L             Specific IGE Antibody   -----         ---------        -------------------    0              <0.10           Absent/Undetectable    0/1        0.10-0.34           Very Low Level    1          0.35-0.69           Low Level    2          0.70-3.49           Moderate Level    3          3.50-17.4           High Level    4          17.5-49.9           Very High Level    5                        Very High Level    6              >100            Very High Level     The clinical relevance of allergen results of  0.10-0.34 kU/L are undetermined and intended for   specialist use.     Allergens denoted with a \"**\" include results using  one or more analyte specific reagents. In those  cases, the test was developed and its analytical  performance characteristics have been determined by  [employer]. It has not been cleared or approved  by the U.S. Food and Drug Administration. This assay   has been v alidated pursuant to the CLIA regulations   and is used for clinical purposes.      Narrative    FASTING:UNKNOWN    FASTING: UNKNOWN       11:45 AM      Results were successfully communicated with the mother and they acknowledged " their understanding.

## 2025-06-29 ENCOUNTER — PATIENT MESSAGE (OUTPATIENT)
Dept: PRIMARY CARE | Facility: CLINIC | Age: 30
End: 2025-06-29
Payer: COMMERCIAL

## 2025-07-17 ENCOUNTER — HOSPITAL ENCOUNTER (OUTPATIENT)
Dept: RADIOLOGY | Facility: CLINIC | Age: 30
Discharge: HOME | End: 2025-07-17
Payer: COMMERCIAL

## 2025-07-17 DIAGNOSIS — R51.9 HEADACHE, UNSPECIFIED: ICD-10-CM

## 2025-07-17 DIAGNOSIS — R42 DIZZINESS AND GIDDINESS: ICD-10-CM

## 2025-07-17 PROCEDURE — 70551 MRI BRAIN STEM W/O DYE: CPT

## 2025-10-13 ENCOUNTER — APPOINTMENT (OUTPATIENT)
Dept: ENDOCRINOLOGY | Facility: CLINIC | Age: 30
End: 2025-10-13
Payer: COMMERCIAL

## 2026-04-15 ENCOUNTER — APPOINTMENT (OUTPATIENT)
Dept: ALLERGY | Facility: CLINIC | Age: 31
End: 2026-04-15
Payer: COMMERCIAL